# Patient Record
Sex: MALE | Race: ASIAN | NOT HISPANIC OR LATINO
[De-identification: names, ages, dates, MRNs, and addresses within clinical notes are randomized per-mention and may not be internally consistent; named-entity substitution may affect disease eponyms.]

---

## 2019-12-18 ENCOUNTER — APPOINTMENT (OUTPATIENT)
Dept: CARDIOLOGY | Facility: CLINIC | Age: 69
End: 2019-12-18
Payer: MEDICARE

## 2019-12-18 VITALS
WEIGHT: 161 LBS | DIASTOLIC BLOOD PRESSURE: 82 MMHG | BODY MASS INDEX: 24.4 KG/M2 | RESPIRATION RATE: 15 BRPM | HEART RATE: 72 BPM | SYSTOLIC BLOOD PRESSURE: 124 MMHG | HEIGHT: 68 IN

## 2019-12-18 DIAGNOSIS — I10 ESSENTIAL (PRIMARY) HYPERTENSION: ICD-10-CM

## 2019-12-18 PROCEDURE — 93000 ELECTROCARDIOGRAM COMPLETE: CPT

## 2019-12-18 PROCEDURE — 99203 OFFICE O/P NEW LOW 30 MIN: CPT

## 2019-12-18 RX ORDER — LOSARTAN POTASSIUM AND HYDROCHLOROTHIAZIDE 12.5; 5 MG/1; MG/1
50-12.5 TABLET ORAL
Refills: 0 | Status: ACTIVE | COMMUNITY
Start: 2019-12-18

## 2019-12-18 NOTE — PHYSICAL EXAM
[General Appearance - Well Developed] : well developed [Normal Appearance] : normal appearance [General Appearance - Well Nourished] : well nourished [Well Groomed] : well groomed [No Deformities] : no deformities [Normal Conjunctiva] : the conjunctiva exhibited no abnormalities [General Appearance - In No Acute Distress] : no acute distress [Normal Oral Mucosa] : normal oral mucosa [Eyelids - No Xanthelasma] : the eyelids demonstrated no xanthelasmas [No Oral Pallor] : no oral pallor [No Oral Cyanosis] : no oral cyanosis [Heart Sounds] : normal S1 and S2 [Heart Rate And Rhythm] : heart rate and rhythm were normal [Murmurs] : no murmurs present [Respiration, Rhythm And Depth] : normal respiratory rhythm and effort [Auscultation Breath Sounds / Voice Sounds] : lungs were clear to auscultation bilaterally [Exaggerated Use Of Accessory Muscles For Inspiration] : no accessory muscle use [Abdomen Soft] : soft [Abdomen Tenderness] : non-tender [Abdomen Mass (___ Cm)] : no abdominal mass palpated [Nail Clubbing] : no clubbing of the fingernails [Cyanosis, Localized] : no localized cyanosis [Petechial Hemorrhages (___cm)] : no petechial hemorrhages [Skin Color & Pigmentation] : normal skin color and pigmentation [] : no rash [No Venous Stasis] : no venous stasis [Skin Lesions] : no skin lesions [No Skin Ulcers] : no skin ulcer [No Xanthoma] : no  xanthoma was observed [Oriented To Time, Place, And Person] : oriented to person, place, and time [Mood] : the mood was normal [Affect] : the affect was normal [No Anxiety] : not feeling anxious

## 2019-12-18 NOTE — HISTORY OF PRESENT ILLNESS
[FreeTextEntry1] : 1. HTN: on medications, controlled.\par 2. Pre-op: patient is for removal and replacement of penile implant.\par He denies any CP or SOB. ET is stable. He denies any palpitations or dizziness.\par

## 2019-12-18 NOTE — REASON FOR VISIT
[Initial Evaluation] : an initial evaluation of [Hypertension] : hypertension [FreeTextEntry1] : 69 M HTN for pre-op assessment.

## 2019-12-25 ENCOUNTER — INPATIENT (INPATIENT)
Facility: HOSPITAL | Age: 69
LOS: 4 days | Discharge: HOME | DRG: 673 | End: 2019-12-30
Attending: HOSPITALIST | Admitting: HOSPITALIST
Payer: MEDICARE

## 2019-12-25 VITALS
HEIGHT: 68 IN | SYSTOLIC BLOOD PRESSURE: 126 MMHG | RESPIRATION RATE: 16 BRPM | TEMPERATURE: 103 F | HEART RATE: 96 BPM | WEIGHT: 164.91 LBS | OXYGEN SATURATION: 95 % | DIASTOLIC BLOOD PRESSURE: 87 MMHG

## 2019-12-25 DIAGNOSIS — E87.1 HYPO-OSMOLALITY AND HYPONATREMIA: ICD-10-CM

## 2019-12-25 DIAGNOSIS — Z02.9 ENCOUNTER FOR ADMINISTRATIVE EXAMINATIONS, UNSPECIFIED: ICD-10-CM

## 2019-12-25 DIAGNOSIS — J18.9 PNEUMONIA, UNSPECIFIED ORGANISM: ICD-10-CM

## 2019-12-25 DIAGNOSIS — Z90.49 ACQUIRED ABSENCE OF OTHER SPECIFIED PARTS OF DIGESTIVE TRACT: Chronic | ICD-10-CM

## 2019-12-25 DIAGNOSIS — I10 ESSENTIAL (PRIMARY) HYPERTENSION: ICD-10-CM

## 2019-12-25 DIAGNOSIS — A41.9 SEPSIS, UNSPECIFIED ORGANISM: ICD-10-CM

## 2019-12-25 DIAGNOSIS — Z90.79 ACQUIRED ABSENCE OF OTHER GENITAL ORGAN(S): Chronic | ICD-10-CM

## 2019-12-25 DIAGNOSIS — R06.6 HICCOUGH: ICD-10-CM

## 2019-12-25 LAB
ALBUMIN SERPL ELPH-MCNC: 3.8 G/DL — SIGNIFICANT CHANGE UP (ref 3.3–5)
ALP SERPL-CCNC: 53 U/L — SIGNIFICANT CHANGE UP (ref 40–120)
ALT FLD-CCNC: 18 U/L — SIGNIFICANT CHANGE UP (ref 10–45)
ANION GAP SERPL CALC-SCNC: 15 MMOL/L — SIGNIFICANT CHANGE UP (ref 5–17)
APPEARANCE UR: CLEAR — SIGNIFICANT CHANGE UP
APTT BLD: 32.6 SEC — SIGNIFICANT CHANGE UP (ref 27.5–36.3)
AST SERPL-CCNC: 24 U/L — SIGNIFICANT CHANGE UP (ref 10–40)
B PERT DNA SPEC QL NAA+PROBE: SIGNIFICANT CHANGE UP
BACTERIA # UR AUTO: NEGATIVE — SIGNIFICANT CHANGE UP
BASE EXCESS BLDV CALC-SCNC: 3.7 MMOL/L — HIGH (ref -2–2)
BASOPHILS # BLD AUTO: 0.01 K/UL — SIGNIFICANT CHANGE UP (ref 0–0.2)
BASOPHILS NFR BLD AUTO: 0.1 % — SIGNIFICANT CHANGE UP (ref 0–2)
BILIRUB SERPL-MCNC: 1.2 MG/DL — SIGNIFICANT CHANGE UP (ref 0.2–1.2)
BILIRUB UR-MCNC: NEGATIVE — SIGNIFICANT CHANGE UP
BUN SERPL-MCNC: 16 MG/DL — SIGNIFICANT CHANGE UP (ref 7–23)
C PNEUM DNA SPEC QL NAA+PROBE: SIGNIFICANT CHANGE UP
CA-I SERPL-SCNC: 1.04 MMOL/L — LOW (ref 1.12–1.3)
CALCIUM SERPL-MCNC: 8.8 MG/DL — SIGNIFICANT CHANGE UP (ref 8.4–10.5)
CHLORIDE BLDV-SCNC: 97 MMOL/L — SIGNIFICANT CHANGE UP (ref 96–108)
CHLORIDE SERPL-SCNC: 92 MMOL/L — LOW (ref 96–108)
CO2 BLDV-SCNC: 27 MMOL/L — SIGNIFICANT CHANGE UP (ref 22–30)
CO2 SERPL-SCNC: 24 MMOL/L — SIGNIFICANT CHANGE UP (ref 22–31)
COLOR SPEC: YELLOW — SIGNIFICANT CHANGE UP
CREAT SERPL-MCNC: 1.05 MG/DL — SIGNIFICANT CHANGE UP (ref 0.5–1.3)
DIFF PNL FLD: SIGNIFICANT CHANGE UP
EOSINOPHIL # BLD AUTO: 0.01 K/UL — SIGNIFICANT CHANGE UP (ref 0–0.5)
EOSINOPHIL NFR BLD AUTO: 0.1 % — SIGNIFICANT CHANGE UP (ref 0–6)
EPI CELLS # UR: 1 /HPF — SIGNIFICANT CHANGE UP (ref 0–5)
FIBRINOGEN PPP-MCNC: 1048 MG/DL — HIGH (ref 350–510)
FLUAV H1 2009 PAND RNA SPEC QL NAA+PROBE: SIGNIFICANT CHANGE UP
FLUAV H1 RNA SPEC QL NAA+PROBE: SIGNIFICANT CHANGE UP
FLUAV H3 RNA SPEC QL NAA+PROBE: SIGNIFICANT CHANGE UP
FLUAV SUBTYP SPEC NAA+PROBE: SIGNIFICANT CHANGE UP
FLUBV RNA SPEC QL NAA+PROBE: SIGNIFICANT CHANGE UP
GAS PNL BLDV: 128 MMOL/L — LOW (ref 135–145)
GAS PNL BLDV: SIGNIFICANT CHANGE UP
GAS PNL BLDV: SIGNIFICANT CHANGE UP
GLUCOSE BLDV-MCNC: 126 MG/DL — HIGH (ref 70–99)
GLUCOSE SERPL-MCNC: 125 MG/DL — HIGH (ref 70–99)
GLUCOSE UR QL: NEGATIVE — SIGNIFICANT CHANGE UP
HADV DNA SPEC QL NAA+PROBE: SIGNIFICANT CHANGE UP
HAPTOGLOB SERPL-MCNC: 274 MG/DL — HIGH (ref 34–200)
HCO3 BLDV-SCNC: 26 MMOL/L — SIGNIFICANT CHANGE UP (ref 21–29)
HCOV PNL SPEC NAA+PROBE: SIGNIFICANT CHANGE UP
HCT VFR BLD CALC: 43.3 % — SIGNIFICANT CHANGE UP (ref 39–50)
HCT VFR BLDA CALC: 48 % — SIGNIFICANT CHANGE UP (ref 39–50)
HGB BLD CALC-MCNC: 15.5 G/DL — SIGNIFICANT CHANGE UP (ref 13–17)
HGB BLD-MCNC: 14.7 G/DL — SIGNIFICANT CHANGE UP (ref 13–17)
HMPV RNA SPEC QL NAA+PROBE: SIGNIFICANT CHANGE UP
HOROWITZ INDEX BLDV+IHG-RTO: SIGNIFICANT CHANGE UP
HPIV1 RNA SPEC QL NAA+PROBE: SIGNIFICANT CHANGE UP
HPIV2 RNA SPEC QL NAA+PROBE: SIGNIFICANT CHANGE UP
HPIV3 RNA SPEC QL NAA+PROBE: SIGNIFICANT CHANGE UP
HPIV4 RNA SPEC QL NAA+PROBE: SIGNIFICANT CHANGE UP
HYALINE CASTS # UR AUTO: 1 /LPF — SIGNIFICANT CHANGE UP (ref 0–7)
IMM GRANULOCYTES NFR BLD AUTO: 0.5 % — SIGNIFICANT CHANGE UP (ref 0–1.5)
INR BLD: 1.31 RATIO — HIGH (ref 0.88–1.16)
INR BLD: 1.33 RATIO — HIGH (ref 0.88–1.16)
KETONES UR-MCNC: SIGNIFICANT CHANGE UP
LACTATE BLDV-MCNC: 1.9 MMOL/L — SIGNIFICANT CHANGE UP (ref 0.7–2)
LEUKOCYTE ESTERASE UR-ACNC: NEGATIVE — SIGNIFICANT CHANGE UP
LYMPHOCYTES # BLD AUTO: 1.03 K/UL — SIGNIFICANT CHANGE UP (ref 1–3.3)
LYMPHOCYTES # BLD AUTO: 9.1 % — LOW (ref 13–44)
MCHC RBC-ENTMCNC: 31.8 PG — SIGNIFICANT CHANGE UP (ref 27–34)
MCHC RBC-ENTMCNC: 33.9 GM/DL — SIGNIFICANT CHANGE UP (ref 32–36)
MCV RBC AUTO: 93.7 FL — SIGNIFICANT CHANGE UP (ref 80–100)
MONOCYTES # BLD AUTO: 0.83 K/UL — SIGNIFICANT CHANGE UP (ref 0–0.9)
MONOCYTES NFR BLD AUTO: 7.3 % — SIGNIFICANT CHANGE UP (ref 2–14)
NEUTROPHILS # BLD AUTO: 9.43 K/UL — HIGH (ref 1.8–7.4)
NEUTROPHILS NFR BLD AUTO: 82.9 % — HIGH (ref 43–77)
NITRITE UR-MCNC: NEGATIVE — SIGNIFICANT CHANGE UP
NRBC # BLD: 0 /100 WBCS — SIGNIFICANT CHANGE UP (ref 0–0)
PCO2 BLDV: 35 MMHG — SIGNIFICANT CHANGE UP (ref 35–50)
PH BLDV: 7.49 — HIGH (ref 7.35–7.45)
PH UR: 6.5 — SIGNIFICANT CHANGE UP (ref 5–8)
PLATELET # BLD AUTO: 185 K/UL — SIGNIFICANT CHANGE UP (ref 150–400)
PO2 BLDV: 37 MMHG — SIGNIFICANT CHANGE UP (ref 25–45)
POTASSIUM BLDV-SCNC: 3.7 MMOL/L — SIGNIFICANT CHANGE UP (ref 3.5–5.3)
POTASSIUM SERPL-MCNC: 3.9 MMOL/L — SIGNIFICANT CHANGE UP (ref 3.5–5.3)
POTASSIUM SERPL-SCNC: 3.9 MMOL/L — SIGNIFICANT CHANGE UP (ref 3.5–5.3)
PROT SERPL-MCNC: 7.9 G/DL — SIGNIFICANT CHANGE UP (ref 6–8.3)
PROT UR-MCNC: SIGNIFICANT CHANGE UP
PROTHROM AB SERPL-ACNC: 15 SEC — HIGH (ref 10–12.9)
PROTHROM AB SERPL-ACNC: 15.4 SEC — HIGH (ref 10–12.9)
RAPID RVP RESULT: SIGNIFICANT CHANGE UP
RBC # BLD: 4.62 M/UL — SIGNIFICANT CHANGE UP (ref 4.2–5.8)
RBC # FLD: 11.7 % — SIGNIFICANT CHANGE UP (ref 10.3–14.5)
RBC CASTS # UR COMP ASSIST: 1 /HPF — SIGNIFICANT CHANGE UP (ref 0–4)
RSV RNA SPEC QL NAA+PROBE: SIGNIFICANT CHANGE UP
RV+EV RNA SPEC QL NAA+PROBE: SIGNIFICANT CHANGE UP
SAO2 % BLDV: 72 % — SIGNIFICANT CHANGE UP (ref 67–88)
SODIUM SERPL-SCNC: 131 MMOL/L — LOW (ref 135–145)
SP GR SPEC: 1.02 — SIGNIFICANT CHANGE UP (ref 1.01–1.02)
TROPONIN T, HIGH SENSITIVITY RESULT: 8 NG/L — SIGNIFICANT CHANGE UP (ref 0–51)
UROBILINOGEN FLD QL: SIGNIFICANT CHANGE UP
WBC # BLD: 11.37 K/UL — HIGH (ref 3.8–10.5)
WBC # FLD AUTO: 11.37 K/UL — HIGH (ref 3.8–10.5)
WBC UR QL: 2 /HPF — SIGNIFICANT CHANGE UP (ref 0–5)

## 2019-12-25 PROCEDURE — 99285 EMERGENCY DEPT VISIT HI MDM: CPT | Mod: GC

## 2019-12-25 PROCEDURE — 99223 1ST HOSP IP/OBS HIGH 75: CPT

## 2019-12-25 PROCEDURE — 71045 X-RAY EXAM CHEST 1 VIEW: CPT | Mod: 26

## 2019-12-25 PROCEDURE — 74177 CT ABD & PELVIS W/CONTRAST: CPT | Mod: 26

## 2019-12-25 RX ORDER — ACETAMINOPHEN 500 MG
975 TABLET ORAL ONCE
Refills: 0 | Status: COMPLETED | OUTPATIENT
Start: 2019-12-25 | End: 2019-12-25

## 2019-12-25 RX ORDER — SODIUM CHLORIDE 9 MG/ML
1500 INJECTION INTRAMUSCULAR; INTRAVENOUS; SUBCUTANEOUS ONCE
Refills: 0 | Status: COMPLETED | OUTPATIENT
Start: 2019-12-25 | End: 2019-12-25

## 2019-12-25 RX ORDER — METOCLOPRAMIDE HCL 10 MG
10 TABLET ORAL ONCE
Refills: 0 | Status: COMPLETED | OUTPATIENT
Start: 2019-12-25 | End: 2019-12-25

## 2019-12-25 RX ORDER — ACETAMINOPHEN 500 MG
650 TABLET ORAL EVERY 6 HOURS
Refills: 0 | Status: DISCONTINUED | OUTPATIENT
Start: 2019-12-25 | End: 2019-12-27

## 2019-12-25 RX ORDER — AZITHROMYCIN 500 MG/1
500 TABLET, FILM COATED ORAL ONCE
Refills: 0 | Status: COMPLETED | OUTPATIENT
Start: 2019-12-25 | End: 2019-12-25

## 2019-12-25 RX ORDER — VANCOMYCIN HCL 1 G
VIAL (EA) INTRAVENOUS
Refills: 0 | Status: DISCONTINUED | OUTPATIENT
Start: 2019-12-25 | End: 2019-12-26

## 2019-12-25 RX ORDER — ONDANSETRON 8 MG/1
4 TABLET, FILM COATED ORAL EVERY 6 HOURS
Refills: 0 | Status: DISCONTINUED | OUTPATIENT
Start: 2019-12-25 | End: 2019-12-27

## 2019-12-25 RX ORDER — CEFTRIAXONE 500 MG/1
1000 INJECTION, POWDER, FOR SOLUTION INTRAMUSCULAR; INTRAVENOUS ONCE
Refills: 0 | Status: COMPLETED | OUTPATIENT
Start: 2019-12-25 | End: 2019-12-25

## 2019-12-25 RX ORDER — VANCOMYCIN HCL 1 G
1000 VIAL (EA) INTRAVENOUS ONCE
Refills: 0 | Status: COMPLETED | OUTPATIENT
Start: 2019-12-25 | End: 2019-12-25

## 2019-12-25 RX ORDER — PIPERACILLIN AND TAZOBACTAM 4; .5 G/20ML; G/20ML
3.38 INJECTION, POWDER, LYOPHILIZED, FOR SOLUTION INTRAVENOUS EVERY 8 HOURS
Refills: 0 | Status: DISCONTINUED | OUTPATIENT
Start: 2019-12-25 | End: 2019-12-27

## 2019-12-25 RX ORDER — VANCOMYCIN HCL 1 G
1000 VIAL (EA) INTRAVENOUS EVERY 24 HOURS
Refills: 0 | Status: DISCONTINUED | OUTPATIENT
Start: 2019-12-26 | End: 2019-12-26

## 2019-12-25 RX ORDER — ENOXAPARIN SODIUM 100 MG/ML
40 INJECTION SUBCUTANEOUS DAILY
Refills: 0 | Status: DISCONTINUED | OUTPATIENT
Start: 2019-12-25 | End: 2019-12-25

## 2019-12-25 RX ORDER — PIPERACILLIN AND TAZOBACTAM 4; .5 G/20ML; G/20ML
3.38 INJECTION, POWDER, LYOPHILIZED, FOR SOLUTION INTRAVENOUS ONCE
Refills: 0 | Status: COMPLETED | OUTPATIENT
Start: 2019-12-25 | End: 2019-12-25

## 2019-12-25 RX ADMIN — Medication 975 MILLIGRAM(S): at 06:34

## 2019-12-25 RX ADMIN — SODIUM CHLORIDE 1500 MILLILITER(S): 9 INJECTION INTRAMUSCULAR; INTRAVENOUS; SUBCUTANEOUS at 06:33

## 2019-12-25 RX ADMIN — Medication 10 MILLIGRAM(S): at 06:55

## 2019-12-25 RX ADMIN — Medication 650 MILLIGRAM(S): at 20:50

## 2019-12-25 RX ADMIN — AZITHROMYCIN 250 MILLIGRAM(S): 500 TABLET, FILM COATED ORAL at 06:50

## 2019-12-25 RX ADMIN — CEFTRIAXONE 100 MILLIGRAM(S): 500 INJECTION, POWDER, FOR SOLUTION INTRAMUSCULAR; INTRAVENOUS at 06:32

## 2019-12-25 RX ADMIN — Medication 650 MILLIGRAM(S): at 19:53

## 2019-12-25 RX ADMIN — Medication 250 MILLIGRAM(S): at 19:54

## 2019-12-25 RX ADMIN — PIPERACILLIN AND TAZOBACTAM 200 GRAM(S): 4; .5 INJECTION, POWDER, LYOPHILIZED, FOR SOLUTION INTRAVENOUS at 22:27

## 2019-12-25 NOTE — ED ADULT NURSE NOTE - OBJECTIVE STATEMENT
68 Yo male BIBEMS for undiagnosed flu. Patient reports he has not been feeling well for 5 days, with associated burning upon urination on day 1. Patient reports that today, patient reporting worsening fever and chills. Family noticed patient was very warm, called EMS, found to be 102 oral temp. Patient A&OX3, denies chest pain, SOB, HA, N/V/D, abdominal pain, fever/chills, urinary symptoms, hematuria, weakness, dizziness, numbness, tinging. Peripheral pulses present b/l. Skin warm, dry and pink. Pt placed in position of comfort. Pt educated on call bell system and provided call bell. Bed in lowest position, wheels locked, appropriate side rails raised. Pt denies needs at this time.

## 2019-12-25 NOTE — H&P ADULT - PROBLEM SELECTOR PLAN 2
Unclear of source of any infection   S/P Zithromax and Ceftriaxone in the ED   CXR / RVP are non revealing   F/UP Blood CX   F/up Lactate   F/up repeat Lactate   INR is mildly elevated / will repeat and add fibrinogen , PLT Is reasonable with no bleed   Will get U/a as pt states that in 6/2019 , [place] and in 8/2019 , he went to [place] to have the implant removed due to report of infection   F/UP UCX   Will get CT of ABD and might get Urology involved  cont Ceftriaxone Unclear of source of any infection   S/P Zithromax and Ceftriaxone in the ED   CXR / RVP are non revealing   F/UP Blood CX   F/up repeat Lactate   INR is mildly elevated / will repeat and add fibrinogen , PLT Is reasonable with no bleed  unsure if secondary to sepsis / will repeat lab and get haptoglobin   Will get U/a as pt states that in 6/2019 , [place] and in 8/2019 , he went to [place] to have the implant removed due to report of infection   F/UP UCX   Will get CT of ABD and might get Urology involved  cont Ceftriaxone for now

## 2019-12-25 NOTE — ED PROVIDER NOTE - CLINICAL SUMMARY MEDICAL DECISION MAKING FREE TEXT BOX
69M presenting for evaluation of fevers with hiccups, on exam with coarse breath sounds left lower lobe o/w clear, hx of 5 days of dysuria, will check sepsis labs, treat with fluids, empirically antibiose, follow up studies, reassess, dispo.

## 2019-12-25 NOTE — H&P ADULT - PROBLEM SELECTOR PLAN 4
Improved now   will cont to monitor   since pt has high fever and recent urologic procedure , will get CT of abd

## 2019-12-25 NOTE — ED PROVIDER NOTE - OBJECTIVE STATEMENT
1 week ago mild dysuria, for the past 3-4 days cough and congestion with fevers, tonight had hiccups for hours so decided to come in as they are causing him pain in his upper chest, still eating and drinking OK, no nausea or vomiting, has had intermittent diaphoresis. Notes that his chest pain is only present with hiccuping at first but now feels like a burning sensation in his esophagus, no other complaints at this time, no hx of cardiac disease, has htn, non smoker, non drinker, no ill contacts, did get flu vaccine this year. 1 week ago mild dysuria, for the past 3-4 days cough and congestion with fevers, tonight had hiccups for hours so decided to come in as they are causing him pain in his upper chest, still eating and drinking OK, no nausea or vomiting, has had intermittent diaphoresis. Notes that his chest pain is only present with hiccuping at first but now feels like a burning sensation in his esophagus, no other complaints at this time, no hx of cardiac disease, has htn, non smoker, non drinker, no ill contacts, did get flu vaccine this year.    PMD: Alec Durán in [place]. (unattached)

## 2019-12-25 NOTE — CONSULT NOTE ADULT - ATTENDING COMMENTS
Pt seen and examined  films reviewed- CT- appears to have extender cap persistent, ? partially in and partially out proximal left corpora dorsally at base.  Mildly tender, but no fluctuance in infrapubic region, along penile shaft.  No erythema.  agree with plan above  Will plan for penile and scrotal ultrasound to more fully image  fever source unclear  d/w pt, family

## 2019-12-25 NOTE — CONSULT NOTE ADULT - SUBJECTIVE AND OBJECTIVE BOX
HPI  69 year old male with hx of HTN, penile prosthesis  s/p explant in [place]  presenting to the hospital with fevers x1 week. He did note self-resolving URI symptoms. Complains that genital area feels "hot". No difficulty urinating or urinary symptoms.        PAST MEDICAL & SURGICAL HISTORY:  Prostate cancer: denies chemo and radiation  Hypertension  Rotator cuff disorder  S/P prostatectomy:   S/P appendectomy: 20y/o      MEDICATIONS  (STANDING):    MEDICATIONS  (PRN):  acetaminophen   Tablet .. 650 milliGRAM(s) Oral every 6 hours PRN Mild Pain (1 - 3)  ondansetron Injectable 4 milliGRAM(s) IV Push every 6 hours PRN Nausea      Allergies  No Known Allergies      REVIEW OF SYSTEMS:   Otherwise negative as stated in HPI    Physical Exam  Vital signs  T(C): 37.4 (19 @ 14:42), Max: 39.4 (19 @ 05:42)  HR: 85 (19 @ 14:42)  BP: 120/79 (19 @ 14:42)  SpO2: 94% (19 @ 14:42)  Wt(kg): --    Output    OUT:    Voided: 160 mL  Total OUT: 160 mL    Total NET: -160 mL      Gen:  NAD    Pulm:  No respiratory distress  	  CV:  RRR    GI:  S/ND/NT    :  Mild scrotal erythema  Firmness to penis and base  No crepitus or tenderness    MSK:      LABS:       @ 06:30    WBC 11.37 / Hct 43.3  / SCr 1.05         131<L>  |  92<L>  |  16  ----------------------------<  125<H>  3.9   |  24  |  1.05    Ca    8.8      25 Dec 2019 06:30    TPro  7.9  /  Alb  3.8  /  TBili  1.2  /  DBili  x   /  AST  24  /  ALT  18  /  AlkPhos  53      PT/INR - ( 25 Dec 2019 12:41 )   PT: 15.0 sec;   INR: 1.31 ratio         PTT - ( 25 Dec 2019 06:30 )  PTT:32.6 sec  Urinalysis Basic - ( 25 Dec 2019 15:29 )    Color: Yellow / Appearance: Clear / S.016 / pH: x  Gluc: x / Ketone: Trace  / Bili: Negative / Urobili: <2 mg/dL   Blood: x / Protein: Trace / Nitrite: Negative   Leuk Esterase: Negative / RBC: x / WBC x   Sq Epi: x / Non Sq Epi: x / Bacteria: x        Urine Cx:  Blood Cx:    RADIOLOGY: HPI  69 year old male with hx of HTN, penile prosthesis  s/p explant in [place]  presenting to the hospital with fevers x1 week. He did note self-resolving URI symptoms. Complains that genital area feels "hot". No difficulty urinating or urinary symptoms.  Also with issues of congestion, cough, fever.      PAST MEDICAL & SURGICAL HISTORY:  Prostate cancer: denies chemo and radiation  Hypertension  Rotator cuff disorder  S/P prostatectomy:   S/P appendectomy: 20y/o      MEDICATIONS  (STANDING):    MEDICATIONS  (PRN):  acetaminophen   Tablet .. 650 milliGRAM(s) Oral every 6 hours PRN Mild Pain (1 - 3)  ondansetron Injectable 4 milliGRAM(s) IV Push every 6 hours PRN Nausea      Allergies  No Known Allergies      REVIEW OF SYSTEMS:   Otherwise negative as stated in HPI    Physical Exam  Vital signs  T(C): 37.4 (19 @ 14:42), Max: 39.4 (19 @ 05:42)  HR: 85 (19 @ 14:42)  BP: 120/79 (19 @ 14:42)  SpO2: 94% (19 @ 14:42)  Wt(kg): --    Output    OUT:    Voided: 160 mL  Total OUT: 160 mL    Total NET: -160 mL      Gen:  NAD    Pulm:  No respiratory distress  	  CV:  RRR    GI:  S/ND/NT    :  Mild scrotal erythema  Firmness to penis and base  No crepitus or tenderness    MSK:      LABS:       @ 06:30    WBC 11.37 / Hct 43.3  / SCr 1.05         131<L>  |  92<L>  |  16  ----------------------------<  125<H>  3.9   |  24  |  1.05    Ca    8.8      25 Dec 2019 06:30    TPro  7.9  /  Alb  3.8  /  TBili  1.2  /  DBili  x   /  AST  24  /  ALT  18  /  AlkPhos  53      PT/INR - ( 25 Dec 2019 12:41 )   PT: 15.0 sec;   INR: 1.31 ratio         PTT - ( 25 Dec 2019 06:30 )  PTT:32.6 sec  Urinalysis Basic - ( 25 Dec 2019 15:29 )    Color: Yellow / Appearance: Clear / S.016 / pH: x  Gluc: x / Ketone: Trace  / Bili: Negative / Urobili: <2 mg/dL   Blood: x / Protein: Trace / Nitrite: Negative   Leuk Esterase: Negative / RBC: x / WBC x   Sq Epi: x / Non Sq Epi: x / Bacteria: x        Urine Cx:  Blood Cx:    RADIOLOGY:

## 2019-12-25 NOTE — ED PROVIDER NOTE - ATTENDING CONTRIBUTION TO CARE
70yo male pmh HTN, prostate CA p/w cough, congestion, hiccups, diaphoresis, chest burning. Denies n/v/d, recent travel, LLL ronchi, abd soft, febrile. Sepsis workup.

## 2019-12-25 NOTE — H&P ADULT - PROBLEM SELECTOR PLAN 1
Patient is on Losartan/HCTz at home / he does not have the dose   Since pt has sepsis of unclear origin and BP is on the soft range/ Will hold antihypertensive and once BP stabilizes , will restart with the lowest dose of Losartan   his RX is [facility] (  ) and no answer today   MOnitor BP Patient is on Losartan/HCTz at home / he does not have the dose   Since pt has sepsis of unclear origin and BP is on the soft range/ Will hold antihypertensive and once BP stabilizes , will restart with the lowest dose of Losartan and uptitrate as needed   his RX is [facility] (  ) and no answer today   MOnitor BP  Low salt diet

## 2019-12-25 NOTE — H&P ADULT - ASSESSMENT
70 YO male with H/O of HTN, Reported Prostate CA , recent Penile implant surgery in 6/2019 with removal of the implant in [place] in 8 /2019 as per patient came to the hospital with subjective fever or about one week.

## 2019-12-25 NOTE — ED PROVIDER NOTE - PHYSICAL EXAMINATION
Gen: uncomfortable appearing but conversational and polite  Eyes: PERRLA, EOMI   HENT: Normocephalic, atraumatic. External ears normal, no rhinorrhea, moist mucous membranes.   CV: RRR, no M/R/G  Resp: coarse breath sounds left lower fields, o/w clear, non-labored, speaking without difficulty on room air  Abd: soft, non tender, non rigid, no guarding or rebound tenderness  Back: No CVAT bilaterally, no midline ttp  Skin: moist, cap refill <2 s distally  Neuro: AOx3, speech is fluent and appropriate  Psych: Mood concerned, affect euthymic

## 2019-12-25 NOTE — H&P ADULT - NSHPOUTPATIENTPROVIDERS_GEN_ALL_CORE
PMD: Alec Durán in [place]. PMD: Alec Durán in [place].  Urologist  : Dr Hrary Marshall ( [facility] in [place]) [phone]

## 2019-12-25 NOTE — H&P ADULT - PROBLEM SELECTOR PLAN 3
MOst likely due to volume depletion   Repeat Na and if low will get urine studies   Mentation is intact

## 2019-12-25 NOTE — PATIENT PROFILE ADULT - BRADEN ACTIVITY
Pt evaluated and treated by IRMA Odom. See NP note for full physical exam.   
(3) walks occasionally

## 2019-12-25 NOTE — H&P ADULT - ATTENDING COMMENTS
Pt has troponin and cardiac enzyme pend / Pt had no chest pain , when seen by me .  Please f/up results       Della Sterlingre   Hospitalist

## 2019-12-25 NOTE — H&P ADULT - HISTORY OF PRESENT ILLNESS
For the past 3-4 days cough and congestion with fevers    ED   :  Tm = 102.9F  HR = 72-96   RR = 16-22   SPO2 = 93-97% RA  Pt received 1500ml of NS, Tylenol 975 mg oral once , ZIthromax 500mg IV , Ceftriaxone 1 gram IV once and Reglan 10 mg IV once . 70 YO male with H/O of HTN, Reported Prostate CA , recent Penile implant surgery in 6/2019 with removal of the implant in [place] in 8 /2019 as per patient came to the hospital with subjective fever or about one week.  Symptoms were preceded by dry cough, congestion, with spontaneous  resolution .  Patient states that he could not urinate a few days ago because he could not eat.  Patient denies any abd pain or diarrhea or vomiting.  NO hematuria .  NO polyuria.  NO current chest pain     ED   :  Tm = 102.9F  HR = 72-96   RR = 16-22   SPO2 = 93-97% RA  Pt received 1500ml of NS, Tylenol 975 mg oral once , ZIthromax 500mg IV , Ceftriaxone 1 gram IV once and Reglan 10 mg IV once . 68 YO male with H/O of HTN, Reported Prostate CA unsure of treatment , recent Penile implant surgery in 6/2019 with removal of the implant in [place] in 8 /2019 as per patient came to the hospital with subjective fever for about one week.  Symptoms were preceded by dry cough, congestion, with spontaneous  resolution .  Patient states that the URI symptoms have abetted but is now having hiccup for one week that cause irritation and hence report to the ED.  NO SOB, no actual chest pain , no diarrhea, no vomiting.   Patient states that he could not urinate a few days ago because he could not eat.  Patient denies any abd pain.  NO hematuria .  NO polyuria.  NO current chest pain .  Patient states that in 6/2019, he had a penile implant which he states became infected and since he could not see his urologist fast enough , he went to [place] and had the implant removed as he states.  He states that he was doing ok since 8/2019 after the surgery.  NO known sick contact.      ED   :  Tm = 102.9F  HR = 72-96   RR = 16-22   SPO2 = 93-97% RA  Pt received 1500ml of NS, Tylenol 975 mg oral once , ZIthromax 500mg IV , Ceftriaxone 1 gram IV once and Reglan 10 mg IV once .

## 2019-12-25 NOTE — H&P ADULT - NSICDXPASTMEDICALHX_GEN_ALL_CORE_FT
PAST MEDICAL HISTORY:  Hypertension     Prostate cancer denies chemo and radiation    Rotator cuff disorder

## 2019-12-25 NOTE — CONSULT NOTE ADULT - ASSESSMENT
69 year old male with hx of HTN, penile prosthesis 6/19 s/p explant in [place] 8/19 presenting with fevers. CT shows stranding around remaining piece of prosthesis with small collection (partially imaged)    - Recommend penile ultrasound and scrotal ultrasound  - Trend fevers  - Broaden antibiotics to vancomycin and zosyn  - F/u cultures 69 year old male with hx of HTN, penile prosthesis 6/19 s/p explant in [place] 8/19 presenting with fevers. CT shows stranding around remaining piece of prosthesis with small collection (partially imaged)    - Recommend penile ultrasound and scrotal ultrasound to evaluate for abscess and better delineate anatomy  - Trend fevers  - Broaden antibiotics to vancomycin and zosyn  - F/u cultures

## 2019-12-25 NOTE — ED ADULT NURSE REASSESSMENT NOTE - NS ED NURSE REASSESS COMMENT FT1
Report received from NEYMAR Lee.  Patient awake and alert to person, place and time with family at bedside.  Patient reports some dizziness at this time.  Patient informed to call for assistance and not to get out of bed without calling for help.  Patient aware urine sample needed and urinal at bedside.  L/s clear to auscultation.  Patient denies: chest pain, dizziness. Report received from NEYMAR Lee.  Patient awake and alert to person, place and time with family at bedside.  Patient reports some dizziness at this time.  Patient informed to call for assistance and not to get out of bed without calling for help.  Patient aware urine sample needed and urinal at bedside.  L/s crackles at bases b/l otherwise clear to auscultation.  Patient denies: chest pain, dizziness.

## 2019-12-25 NOTE — H&P ADULT - NSHPLABSRESULTS_GEN_ALL_CORE
Lab results are reviewed by me :  WBC = 11.37  H/H = 14.7 /43.3  PLT = 185  N = 82.9  L-= 9.1   INR = 1,33  PT = 15.4   NA = 131  Cr = 1.05  RVP Neg /  CXR : Neg   Lact = 1.9  PH= 7.49

## 2019-12-26 ENCOUNTER — TRANSCRIPTION ENCOUNTER (OUTPATIENT)
Age: 69
End: 2019-12-26

## 2019-12-26 DIAGNOSIS — M79.5 RESIDUAL FOREIGN BODY IN SOFT TISSUE: ICD-10-CM

## 2019-12-26 DIAGNOSIS — Z29.9 ENCOUNTER FOR PROPHYLACTIC MEASURES, UNSPECIFIED: ICD-10-CM

## 2019-12-26 DIAGNOSIS — R78.81 BACTEREMIA: ICD-10-CM

## 2019-12-26 DIAGNOSIS — A41.9 SEPSIS, UNSPECIFIED ORGANISM: ICD-10-CM

## 2019-12-26 LAB
-  CANDIDA ALBICANS: SIGNIFICANT CHANGE UP
-  CANDIDA GLABRATA: SIGNIFICANT CHANGE UP
-  CANDIDA KRUSEI: SIGNIFICANT CHANGE UP
-  CANDIDA PARAPSILOSIS: SIGNIFICANT CHANGE UP
-  CANDIDA TROPICALIS: SIGNIFICANT CHANGE UP
-  COAGULASE NEGATIVE STAPHYLOCOCCUS: SIGNIFICANT CHANGE UP
-  K. PNEUMONIAE GROUP: SIGNIFICANT CHANGE UP
-  KPC RESISTANCE GENE: SIGNIFICANT CHANGE UP
-  STREPTOCOCCUS SP. (NOT GRP A, B OR S PNEUMONIAE): SIGNIFICANT CHANGE UP
A BAUMANNII DNA SPEC QL NAA+PROBE: SIGNIFICANT CHANGE UP
ALBUMIN SERPL ELPH-MCNC: 2.9 G/DL — LOW (ref 3.3–5)
ALP SERPL-CCNC: 48 U/L — SIGNIFICANT CHANGE UP (ref 40–120)
ALT FLD-CCNC: 25 U/L — SIGNIFICANT CHANGE UP (ref 10–45)
ANION GAP SERPL CALC-SCNC: 13 MMOL/L — SIGNIFICANT CHANGE UP (ref 5–17)
AST SERPL-CCNC: 31 U/L — SIGNIFICANT CHANGE UP (ref 10–40)
BASOPHILS # BLD AUTO: 0.03 K/UL — SIGNIFICANT CHANGE UP (ref 0–0.2)
BASOPHILS NFR BLD AUTO: 0.4 % — SIGNIFICANT CHANGE UP (ref 0–2)
BILIRUB SERPL-MCNC: 0.5 MG/DL — SIGNIFICANT CHANGE UP (ref 0.2–1.2)
BUN SERPL-MCNC: 11 MG/DL — SIGNIFICANT CHANGE UP (ref 7–23)
CALCIUM SERPL-MCNC: 8.3 MG/DL — LOW (ref 8.4–10.5)
CHLORIDE SERPL-SCNC: 98 MMOL/L — SIGNIFICANT CHANGE UP (ref 96–108)
CO2 SERPL-SCNC: 23 MMOL/L — SIGNIFICANT CHANGE UP (ref 22–31)
CREAT SERPL-MCNC: 0.9 MG/DL — SIGNIFICANT CHANGE UP (ref 0.5–1.3)
E CLOAC COMP DNA BLD POS QL NAA+PROBE: SIGNIFICANT CHANGE UP
E COLI DNA BLD POS QL NAA+NON-PROBE: SIGNIFICANT CHANGE UP
ENTEROCOC DNA BLD POS QL NAA+NON-PROBE: SIGNIFICANT CHANGE UP
ENTEROCOC DNA BLD POS QL NAA+NON-PROBE: SIGNIFICANT CHANGE UP
EOSINOPHIL # BLD AUTO: 0.1 K/UL — SIGNIFICANT CHANGE UP (ref 0–0.5)
EOSINOPHIL NFR BLD AUTO: 1.2 % — SIGNIFICANT CHANGE UP (ref 0–6)
GLUCOSE SERPL-MCNC: 120 MG/DL — HIGH (ref 70–99)
GP B STREP DNA BLD POS QL NAA+NON-PROBE: SIGNIFICANT CHANGE UP
GRAM STN FLD: SIGNIFICANT CHANGE UP
HAEM INFLU DNA BLD POS QL NAA+NON-PROBE: SIGNIFICANT CHANGE UP
HCT VFR BLD CALC: 39.5 % — SIGNIFICANT CHANGE UP (ref 39–50)
HCV AB S/CO SERPL IA: 0.14 S/CO — SIGNIFICANT CHANGE UP (ref 0–0.99)
HCV AB SERPL-IMP: SIGNIFICANT CHANGE UP
HGB BLD-MCNC: 13.3 G/DL — SIGNIFICANT CHANGE UP (ref 13–17)
IMM GRANULOCYTES NFR BLD AUTO: 0.4 % — SIGNIFICANT CHANGE UP (ref 0–1.5)
INR BLD: 1.24 RATIO — HIGH (ref 0.88–1.16)
K OXYTOCA DNA BLD POS QL NAA+NON-PROBE: SIGNIFICANT CHANGE UP
L MONOCYTOG DNA BLD POS QL NAA+NON-PROBE: SIGNIFICANT CHANGE UP
LACTATE SERPL-SCNC: 1.1 MMOL/L — SIGNIFICANT CHANGE UP (ref 0.7–2)
LEGIONELLA AG UR QL: NEGATIVE — SIGNIFICANT CHANGE UP
LIDOCAIN IGE QN: 20 U/L — SIGNIFICANT CHANGE UP (ref 7–60)
LYMPHOCYTES # BLD AUTO: 0.82 K/UL — LOW (ref 1–3.3)
LYMPHOCYTES # BLD AUTO: 9.9 % — LOW (ref 13–44)
MAGNESIUM SERPL-MCNC: 2.3 MG/DL — SIGNIFICANT CHANGE UP (ref 1.6–2.6)
MCHC RBC-ENTMCNC: 32 PG — SIGNIFICANT CHANGE UP (ref 27–34)
MCHC RBC-ENTMCNC: 33.7 GM/DL — SIGNIFICANT CHANGE UP (ref 32–36)
MCV RBC AUTO: 95.2 FL — SIGNIFICANT CHANGE UP (ref 80–100)
METHOD TYPE: SIGNIFICANT CHANGE UP
MONOCYTES # BLD AUTO: 0.92 K/UL — HIGH (ref 0–0.9)
MONOCYTES NFR BLD AUTO: 11.1 % — SIGNIFICANT CHANGE UP (ref 2–14)
MRSA SPEC QL CULT: SIGNIFICANT CHANGE UP
MSSA DNA SPEC QL NAA+PROBE: SIGNIFICANT CHANGE UP
N MEN ISLT CULT: SIGNIFICANT CHANGE UP
NEUTROPHILS # BLD AUTO: 6.41 K/UL — SIGNIFICANT CHANGE UP (ref 1.8–7.4)
NEUTROPHILS NFR BLD AUTO: 77 % — SIGNIFICANT CHANGE UP (ref 43–77)
P AERUGINOSA DNA BLD POS NAA+NON-PROBE: SIGNIFICANT CHANGE UP
PLATELET # BLD AUTO: 178 K/UL — SIGNIFICANT CHANGE UP (ref 150–400)
POTASSIUM SERPL-MCNC: 3.9 MMOL/L — SIGNIFICANT CHANGE UP (ref 3.5–5.3)
POTASSIUM SERPL-SCNC: 3.9 MMOL/L — SIGNIFICANT CHANGE UP (ref 3.5–5.3)
PROT SERPL-MCNC: 6.8 G/DL — SIGNIFICANT CHANGE UP (ref 6–8.3)
PROTHROM AB SERPL-ACNC: 14.2 SEC — HIGH (ref 10–13.1)
RBC # BLD: 4.15 M/UL — LOW (ref 4.2–5.8)
RBC # FLD: 11.9 % — SIGNIFICANT CHANGE UP (ref 10.3–14.5)
S MARCESCENS DNA BLD POS NAA+NON-PROBE: SIGNIFICANT CHANGE UP
S PNEUM DNA BLD POS QL NAA+NON-PROBE: SIGNIFICANT CHANGE UP
S PYO DNA BLD POS QL NAA+NON-PROBE: SIGNIFICANT CHANGE UP
SODIUM SERPL-SCNC: 134 MMOL/L — LOW (ref 135–145)
SPECIMEN SOURCE: SIGNIFICANT CHANGE UP
WBC # BLD: 8.31 K/UL — SIGNIFICANT CHANGE UP (ref 3.8–10.5)
WBC # FLD AUTO: 8.31 K/UL — SIGNIFICANT CHANGE UP (ref 3.8–10.5)

## 2019-12-26 PROCEDURE — 76870 US EXAM SCROTUM: CPT | Mod: 26

## 2019-12-26 PROCEDURE — 93975 VASCULAR STUDY: CPT | Mod: 26

## 2019-12-26 PROCEDURE — 99233 SBSQ HOSP IP/OBS HIGH 50: CPT

## 2019-12-26 RX ORDER — VANCOMYCIN HCL 1 G
750 VIAL (EA) INTRAVENOUS ONCE
Refills: 0 | Status: COMPLETED | OUTPATIENT
Start: 2019-12-26 | End: 2019-12-26

## 2019-12-26 RX ORDER — VANCOMYCIN HCL 1 G
750 VIAL (EA) INTRAVENOUS EVERY 8 HOURS
Refills: 0 | Status: DISCONTINUED | OUTPATIENT
Start: 2019-12-26 | End: 2019-12-27

## 2019-12-26 RX ORDER — VANCOMYCIN HCL 1 G
VIAL (EA) INTRAVENOUS
Refills: 0 | Status: DISCONTINUED | OUTPATIENT
Start: 2019-12-26 | End: 2019-12-27

## 2019-12-26 RX ADMIN — PIPERACILLIN AND TAZOBACTAM 25 GRAM(S): 4; .5 INJECTION, POWDER, LYOPHILIZED, FOR SOLUTION INTRAVENOUS at 13:17

## 2019-12-26 RX ADMIN — PIPERACILLIN AND TAZOBACTAM 25 GRAM(S): 4; .5 INJECTION, POWDER, LYOPHILIZED, FOR SOLUTION INTRAVENOUS at 05:17

## 2019-12-26 RX ADMIN — Medication 250 MILLIGRAM(S): at 21:20

## 2019-12-26 RX ADMIN — Medication 250 MILLIGRAM(S): at 13:17

## 2019-12-26 NOTE — PROGRESS NOTE ADULT - PROBLEM SELECTOR PLAN 2
Gram varible rods in 1 blood culture bottle  Will send for repeat cultures and continue Vancomycin and Zosyn for now and F/U repeat blood cultures  Will need source control with retained foreign body, urology following and plan for OR tomorrow.

## 2019-12-26 NOTE — PROGRESS NOTE ADULT - PROBLEM SELECTOR PLAN 3
Had penile implant placed in 6/2019 and subsequently had it removed due to infection in [place] in 8/2019.  He has been feeling well since but now fevers and on CT has retained portion.  Will Continue Vancomycin and Zosyn for now and plan for OR tomorrow to remove foreign body and send cultures. Will f/u culture results and tailor antiboitics.

## 2019-12-26 NOTE — CHART NOTE - NSCHARTNOTEFT_GEN_A_CORE
Medicine PA Note  Critical Value Notification    Name: RAS WEBSTER  MRN: [id number]  Age: 69y Gender: Male    Notified by RN that the blood cultures from 12/25 were resulted. Preliminary results show gram variable rods in the aerobic bottle. Current antimicrobial therapy includes Zosyn and Vanco. Final results and sensitivities from 12/25 are pending.     - Repeat BCx were ordered  - Follow-up with final BCx results  - Continue current abx regimen  - Consider ID consult in AM    Will endorse to primary team in AM.    MAJO MunizC  Department of Medicine   [facility]

## 2019-12-26 NOTE — PROGRESS NOTE ADULT - SUBJECTIVE AND OBJECTIVE BOX
Subjective    Seen & examined at bedside  Febrile overnight  BCx + for gram variable rods  Feeling ok this morning.  No dysuria    Objective    Vital signs  T(F): , Max: 101.7 (12-25-19 @ 19:46)  HR: 78 (12-26-19 @ 04:57)  BP: 107/74 (12-26-19 @ 04:57)  SpO2: 93% (12-26-19 @ 04:57)  Wt(kg): --    Output     12-25 @ 07:01  -  12-26 @ 07:00  --------------------------------------------------------  IN: 420 mL / OUT: 160 mL / NET: 260 mL    Physical Exam  Gen NAD  Pulm No respiratory distress  GI S/ND/NT   Mild scrotal erythema, Firmness to penis and base, No crepitus or tenderness    Labs  12-26 @ 08:51  WBC 8.31  / Hct 39.5  / SCr --       12-26 @ 07:27  WBC --    / Hct --    / SCr 0.90     Culture - Blood (12.25.19 @ 09:05)    -  Multidrug (KPC pos) resistant organism: Nondet    -  Staphylococcus aureus: Nondet    -  Methicillin resistant Staphylococcus aureus (MRSA): Nondet    -  Coagulase negative Staphylococcus: Nondet    -  Enterococcus species: Nondet    -  Vancomycin resistant Enterococcus sp.: Nondet    -  Escherichia coli: Nondet    -  Klebsiella oxytoca: Nondet    -  Klebsiella pneumoniae: Nondet    -  Serratia marcescens: Nondet    -  Haemophilus influenzae: Nondet    -  Listeria monocytogenes: Nondet    -  Neisseria meningitidis: Nondet    -  Pseudomonas aeruginosa: Nondet    -  Acinetobacter baumanii: Nondet    -  Enterobacter cloacae complex: Nondet    -  Streptococcus sp. (Not Grp A, B or S pneumoniae): Nondet    -  Streptococcus agalactiae (Group B): Nondet    -  Streptococcus pyogenes (Group A): Nondet    -  Streptococcus pneumoniae: Nondet    -  Candida albicans: Nondet    -  Candida glabrata: Nondet    -  Candida krusei: Nondet    -  Candida parapsilosis: Nondet    -  Candida tropicalis: Nondet    Gram Stain:   Growth in aerobic bottle: Gram Variable Rods    Specimen Source: .Blood Blood-Peripheral    Organism: Blood Culture PCR    Culture Results:   Growth in aerobic bottle: Gram Variable Rods  "Due to technical problems, Proteus sp. will Not be reported as part of  the BCID panel until further notice"  ***Blood Panel PCR results on this specimen are available  approximately 3 hours after the Gram stain result.***  Gram stain, PCR, and/or culture results may not always  correspond due to difference in methodologies.  ************************************************************  This PCR assay was performed using [employer].  The following targets are tested for: Enterococcus,  vancomycin resistant enterococci, Listeria monocytogenes,  coagulase negative staphylococci, S. aureus,  methicillin resistant S. aureus, Streptococcus agalactiae  (Group B), S. pneumoniae, S. pyogenes (Group A),  Acinetobacter baumannii, Enterobacter cloacae, E. coli,  Klebsiella oxytoca, K. pneumoniae, Proteus sp.,  Serratia marcescens, Haemophilus influenzae,  Neisseria meningitidis, Pseudomonas aeruginosa, Candida  albicans, C. glabrata, C krusei, C parapsilosis,  C. tropicalis and the KPC resistance gene.    Organism Identification: Blood Culture PCR    Method Type: PCR    Imaging    < from: CT Abdomen and Pelvis w/ IV Cont (12.25.19 @ 15:05) >  IMPRESSION:     Minimal patchy consolidation in the lingula.    No acute intra-abdominal pathology.    Foreign body noted within the base of the penis with surrounding stranding. On image 170 of series 3, there is a small fluid collection noted but this is only partially imaged. Correlate with physical exam for abscess/cellulitis.    < end of copied text >

## 2019-12-26 NOTE — PROGRESS NOTE ADULT - ASSESSMENT
68 YO male with H/O of HTN, Reported Prostate CA , recent Penile implant surgery in 6/2019 with removal of the implant in [place] in 8 /2019 as per patient came to the hospital with subjective fever or about one week.

## 2019-12-26 NOTE — PROGRESS NOTE ADULT - ASSESSMENT
69 year old male with hx of HTN, penile prosthesis 6/19 s/p explant in [place] 8/19 presenting with fevers. CT shows stranding around remaining piece of prosthesis with small collection (partially imaged).    Unclear what exactly is still left behind in the corporal body.  Usually rear tip extenders are the retained fragment after penile prosthesis, but normally located in the deepest portion of the corpora.    - Recommend penile ultrasound and scrotal ultrasound to evaluate for abscess and better delineate anatomy  - Will plan for possible OR tomorrow for penile exploration, possible penile prosthesis explantation  - Please pre-operatively clear patient, will obtain consent today  - Will add onto OR for tomorrow w/ Dr. Hoenig  - Trend fevers  - c/w Vanc/Zosyn  - F/u cultures

## 2019-12-26 NOTE — PHARMACOTHERAPY INTERVENTION NOTE - COMMENTS
70 yo M with sepsis/bacteremia currently on zosyn 3.375g IV Q8H and vancomycin 1g IV Q24H (last dose 2/25 20:00). SCr 0.9 (CKD-EPI 87 mL/min) - expected trough with current regimen ~3. Recommendation made to increase to vancomycin 750mg IV Q8H (expected trough ~12).    Chester Bolton, PharmD, BCPS  [phone]

## 2019-12-26 NOTE — PROVIDER CONTACT NOTE (CRITICAL VALUE NOTIFICATION) - SITUATION
Critical value of blood cultures from 12/25 came back as growth in the aerobic bottle of gram variable rods.

## 2019-12-27 ENCOUNTER — RESULT REVIEW (OUTPATIENT)
Age: 69
End: 2019-12-27

## 2019-12-27 LAB
-  AMIKACIN: SIGNIFICANT CHANGE UP
-  AMPICILLIN/SULBACTAM: SIGNIFICANT CHANGE UP
-  CEFEPIME: SIGNIFICANT CHANGE UP
-  CEFTAZIDIME: SIGNIFICANT CHANGE UP
-  CIPROFLOXACIN: SIGNIFICANT CHANGE UP
-  GENTAMICIN: SIGNIFICANT CHANGE UP
-  IMIPENEM: SIGNIFICANT CHANGE UP
-  LEVOFLOXACIN: SIGNIFICANT CHANGE UP
-  MEROPENEM: SIGNIFICANT CHANGE UP
-  PIPERACILLIN/TAZOBACTAM: SIGNIFICANT CHANGE UP
-  TOBRAMYCIN: SIGNIFICANT CHANGE UP
-  TRIMETHOPRIM/SULFAMETHOXAZOLE: SIGNIFICANT CHANGE UP
ANION GAP SERPL CALC-SCNC: 12 MMOL/L — SIGNIFICANT CHANGE UP (ref 5–17)
APTT BLD: 29.6 SEC — SIGNIFICANT CHANGE UP (ref 27.5–36.3)
BASOPHILS # BLD AUTO: 0.02 K/UL — SIGNIFICANT CHANGE UP (ref 0–0.2)
BASOPHILS NFR BLD AUTO: 0.4 % — SIGNIFICANT CHANGE UP (ref 0–2)
BUN SERPL-MCNC: 8 MG/DL — SIGNIFICANT CHANGE UP (ref 7–23)
CALCIUM SERPL-MCNC: 8.3 MG/DL — LOW (ref 8.4–10.5)
CHLORIDE SERPL-SCNC: 102 MMOL/L — SIGNIFICANT CHANGE UP (ref 96–108)
CO2 SERPL-SCNC: 22 MMOL/L — SIGNIFICANT CHANGE UP (ref 22–31)
CREAT SERPL-MCNC: 0.86 MG/DL — SIGNIFICANT CHANGE UP (ref 0.5–1.3)
CULTURE RESULTS: NO GROWTH — SIGNIFICANT CHANGE UP
CULTURE RESULTS: SIGNIFICANT CHANGE UP
EOSINOPHIL # BLD AUTO: 0.15 K/UL — SIGNIFICANT CHANGE UP (ref 0–0.5)
EOSINOPHIL NFR BLD AUTO: 2.8 % — SIGNIFICANT CHANGE UP (ref 0–6)
GLUCOSE SERPL-MCNC: 123 MG/DL — HIGH (ref 70–99)
HCT VFR BLD CALC: 39.7 % — SIGNIFICANT CHANGE UP (ref 39–50)
HGB BLD-MCNC: 13.3 G/DL — SIGNIFICANT CHANGE UP (ref 13–17)
IMM GRANULOCYTES NFR BLD AUTO: 0.4 % — SIGNIFICANT CHANGE UP (ref 0–1.5)
INR BLD: 1.2 RATIO — HIGH (ref 0.88–1.16)
LYMPHOCYTES # BLD AUTO: 0.96 K/UL — LOW (ref 1–3.3)
LYMPHOCYTES # BLD AUTO: 18.1 % — SIGNIFICANT CHANGE UP (ref 13–44)
MCHC RBC-ENTMCNC: 31.4 PG — SIGNIFICANT CHANGE UP (ref 27–34)
MCHC RBC-ENTMCNC: 33.5 GM/DL — SIGNIFICANT CHANGE UP (ref 32–36)
MCV RBC AUTO: 93.9 FL — SIGNIFICANT CHANGE UP (ref 80–100)
METHOD TYPE: SIGNIFICANT CHANGE UP
METHOD TYPE: SIGNIFICANT CHANGE UP
MONOCYTES # BLD AUTO: 0.52 K/UL — SIGNIFICANT CHANGE UP (ref 0–0.9)
MONOCYTES NFR BLD AUTO: 9.8 % — SIGNIFICANT CHANGE UP (ref 2–14)
NEUTROPHILS # BLD AUTO: 3.62 K/UL — SIGNIFICANT CHANGE UP (ref 1.8–7.4)
NEUTROPHILS NFR BLD AUTO: 68.5 % — SIGNIFICANT CHANGE UP (ref 43–77)
NRBC # BLD: 0 /100 WBCS — SIGNIFICANT CHANGE UP (ref 0–0)
ORGANISM # SPEC MICROSCOPIC CNT: SIGNIFICANT CHANGE UP
PLATELET # BLD AUTO: 209 K/UL — SIGNIFICANT CHANGE UP (ref 150–400)
POTASSIUM SERPL-MCNC: 3.7 MMOL/L — SIGNIFICANT CHANGE UP (ref 3.5–5.3)
POTASSIUM SERPL-SCNC: 3.7 MMOL/L — SIGNIFICANT CHANGE UP (ref 3.5–5.3)
PROTHROM AB SERPL-ACNC: 13.9 SEC — HIGH (ref 10–12.9)
RBC # BLD: 4.23 M/UL — SIGNIFICANT CHANGE UP (ref 4.2–5.8)
RBC # FLD: 11.6 % — SIGNIFICANT CHANGE UP (ref 10.3–14.5)
SODIUM SERPL-SCNC: 136 MMOL/L — SIGNIFICANT CHANGE UP (ref 135–145)
SPECIMEN SOURCE: SIGNIFICANT CHANGE UP
SPECIMEN SOURCE: SIGNIFICANT CHANGE UP
VANCOMYCIN TROUGH SERPL-MCNC: 9.3 UG/ML — LOW (ref 10–20)
WBC # BLD: 5.29 K/UL — SIGNIFICANT CHANGE UP (ref 3.8–10.5)
WBC # FLD AUTO: 5.29 K/UL — SIGNIFICANT CHANGE UP (ref 3.8–10.5)

## 2019-12-27 PROCEDURE — 99232 SBSQ HOSP IP/OBS MODERATE 35: CPT | Mod: 25

## 2019-12-27 PROCEDURE — 99223 1ST HOSP IP/OBS HIGH 75: CPT

## 2019-12-27 PROCEDURE — 88305 TISSUE EXAM BY PATHOLOGIST: CPT | Mod: 26

## 2019-12-27 PROCEDURE — 99233 SBSQ HOSP IP/OBS HIGH 50: CPT

## 2019-12-27 PROCEDURE — 54115 TREATMENT OF PENIS LESION: CPT

## 2019-12-27 PROCEDURE — 76857 US EXAM PELVIC LIMITED: CPT | Mod: 26

## 2019-12-27 RX ORDER — AMPICILLIN SODIUM AND SULBACTAM SODIUM 250; 125 MG/ML; MG/ML
3 INJECTION, POWDER, FOR SUSPENSION INTRAMUSCULAR; INTRAVENOUS EVERY 6 HOURS
Refills: 0 | Status: DISCONTINUED | OUTPATIENT
Start: 2019-12-28 | End: 2019-12-29

## 2019-12-27 RX ORDER — ONDANSETRON 8 MG/1
4 TABLET, FILM COATED ORAL ONCE
Refills: 0 | Status: DISCONTINUED | OUTPATIENT
Start: 2019-12-27 | End: 2019-12-27

## 2019-12-27 RX ORDER — HYDROMORPHONE HYDROCHLORIDE 2 MG/ML
0.25 INJECTION INTRAMUSCULAR; INTRAVENOUS; SUBCUTANEOUS
Refills: 0 | Status: DISCONTINUED | OUTPATIENT
Start: 2019-12-27 | End: 2019-12-27

## 2019-12-27 RX ORDER — ACETAMINOPHEN 500 MG
650 TABLET ORAL EVERY 6 HOURS
Refills: 0 | Status: DISCONTINUED | OUTPATIENT
Start: 2019-12-27 | End: 2019-12-30

## 2019-12-27 RX ORDER — HYDROMORPHONE HYDROCHLORIDE 2 MG/ML
0.5 INJECTION INTRAMUSCULAR; INTRAVENOUS; SUBCUTANEOUS
Refills: 0 | Status: DISCONTINUED | OUTPATIENT
Start: 2019-12-27 | End: 2019-12-27

## 2019-12-27 RX ORDER — ONDANSETRON 8 MG/1
4 TABLET, FILM COATED ORAL EVERY 6 HOURS
Refills: 0 | Status: DISCONTINUED | OUTPATIENT
Start: 2019-12-27 | End: 2019-12-30

## 2019-12-27 RX ORDER — PIPERACILLIN AND TAZOBACTAM 4; .5 G/20ML; G/20ML
3.38 INJECTION, POWDER, LYOPHILIZED, FOR SOLUTION INTRAVENOUS EVERY 8 HOURS
Refills: 0 | Status: DISCONTINUED | OUTPATIENT
Start: 2019-12-27 | End: 2019-12-27

## 2019-12-27 RX ORDER — AMPICILLIN SODIUM AND SULBACTAM SODIUM 250; 125 MG/ML; MG/ML
INJECTION, POWDER, FOR SUSPENSION INTRAMUSCULAR; INTRAVENOUS
Refills: 0 | Status: DISCONTINUED | OUTPATIENT
Start: 2019-12-27 | End: 2019-12-29

## 2019-12-27 RX ORDER — ENOXAPARIN SODIUM 100 MG/ML
40 INJECTION SUBCUTANEOUS DAILY
Refills: 0 | Status: DISCONTINUED | OUTPATIENT
Start: 2019-12-27 | End: 2019-12-30

## 2019-12-27 RX ORDER — AMPICILLIN SODIUM AND SULBACTAM SODIUM 250; 125 MG/ML; MG/ML
3 INJECTION, POWDER, FOR SUSPENSION INTRAMUSCULAR; INTRAVENOUS ONCE
Refills: 0 | Status: DISCONTINUED | OUTPATIENT
Start: 2019-12-27 | End: 2019-12-29

## 2019-12-27 RX ORDER — VANCOMYCIN HCL 1 G
750 VIAL (EA) INTRAVENOUS EVERY 8 HOURS
Refills: 0 | Status: DISCONTINUED | OUTPATIENT
Start: 2019-12-27 | End: 2019-12-27

## 2019-12-27 RX ADMIN — Medication 250 MILLIGRAM(S): at 05:07

## 2019-12-27 RX ADMIN — PIPERACILLIN AND TAZOBACTAM 25 GRAM(S): 4; .5 INJECTION, POWDER, LYOPHILIZED, FOR SOLUTION INTRAVENOUS at 00:17

## 2019-12-27 RX ADMIN — PIPERACILLIN AND TAZOBACTAM 25 GRAM(S): 4; .5 INJECTION, POWDER, LYOPHILIZED, FOR SOLUTION INTRAVENOUS at 08:48

## 2019-12-27 NOTE — PROGRESS NOTE ADULT - ASSESSMENT
69 year old male with hx of HTN, penile prosthesis 6/19 s/p explant in [place] 8/19 presenting with fevers. CT shows stranding around remaining piece of prosthesis with small collection (partially imaged).    Unclear what exactly is still left behind in the corporal body.  Usually rear tip extenders are the retained fragment after penile prosthesis, but normally located in the deepest portion of the corpora.    - Penile US reviewed -> small collection and possible retained piece of reservoir anterior to pubic bone in the subQ space connecting to left corporal body  - Will plan for OR today for for penile exploration, possible penile prosthesis explantation  - Please pre-operatively clear patient  - Consent obtained  - Trend fevers  - c/w Vanc/Zosyn  - F/u cultures

## 2019-12-27 NOTE — PROGRESS NOTE ADULT - PROBLEM SELECTOR PLAN 1
2/2 urological retained foreign body from penile implant that was removed 8/2019 in [place].  Continue Vancomycin and Zosyn pending blood culture results as he recently had instrumentation for removal of implant within 3 months.   -Tylenol PRN fever  -F/U Repeat blood cultures  -OR today with urology for removal and possible draining of small abscess on US (0.3cm)

## 2019-12-27 NOTE — PROGRESS NOTE ADULT - PROBLEM SELECTOR PLAN 2
Gram variable rods in 1 blood culture bottle  Will send for repeat cultures and continue Vancomycin and Zosyn for now and F/U repeat blood cultures  Will need source control with retained foreign body, urology following and plan for OR today.

## 2019-12-27 NOTE — PROGRESS NOTE ADULT - PROBLEM SELECTOR PLAN 5
Transitions of Care Status:   1.  Name of PCP: Alec Durán in [place]  2.  PCP Contacted on Admission: [ ] Y    [ ] N    3.  PCP contacted at Discharge: [ ] Y    [ ] N    [ ] N/A  4.  Post-Discharge Appointment Date and Location:  5.  Summary of Handoff given to PCP:

## 2019-12-27 NOTE — CONSULT NOTE ADULT - SUBJECTIVE AND OBJECTIVE BOX
Marilynn Kraus - [phone]    Patient is a 69y old  Male who presents with a chief complaint of Fever, cough, hiccups (27 Dec 2019 12:57)    HPI:  69M with hypertension, prostate cancer s/p radical retropubic prostatectomy at [facility] , no further chemo/RT.  He underwent recent penile implant locally (Dr. Mcguire) in 2019.  Then developed pain/swelling and went to [place] for removal in Aug 2019.  had URI/fever/cough a week ago and improved.  Then again developed fever, non-productive cough, hiccups.  Here, he had pain base of penis, fever.  CT with small collection and retained foreign body.  Seen by  and confirmed by ultrasound.  Initially started on ceftriaxone/zithromax and broadened to vancomycin/zosyn.  BC with GNR that turned out to be acinotobacter radioresistans.  Repeat BC negative.    ID consulted to help management.      prior hospital charts reviewed [ x ]  primary team notes reviewed [ x ]  other consultant notes reviewed [ x ]    PAST MEDICAL & SURGICAL HISTORY:  Prostate cancer: denies chemo and radiation  Hypertension  Rotator cuff disorder/surgery   S/P prostatectomy:   S/P appendectomy: 20y/o    ANTIMICROBIALS:    Allergies  No Known Allergies    ANTIMICROBIALS:  azithromycin  IVPB (12/25 x1)  cefTRIAXone   IVPB (12/25 x1)  piperacillin/tazobactam IVPB.. 3.375 every 8 hours 91-)  vancomycin  IVPB 750 every 8 hours (-)    OTHER MEDS: MEDICATIONS  (STANDING):  acetaminophen   Tablet .. 650 every 6 hours PRN  ondansetron Injectable 4 every 6 hours PRN    SOCIAL HISTORY:   hx smoking, , lives with wife    FAMILY HISTORY:      REVIEW OF SYSTEMS  [  ] ROS unobtainable because:    [ x ] All other systems negative except as noted below:	    Constitutional:  [ x] fever [ ] chills  [ ] weight loss  [ ] weakness  Skin:  [ ] rash [ ] phlebitis	  Eyes: [ ] icterus [ ] pain  [ ] discharge	  ENMT: [ ] sore throat  [ ] thrush [ ] ulcers [ ] exudates  Respiratory: [ ] dyspnea [ ] hemoptysis [x ] cough [ ] sputum	  Cardiovascular:  [ ] chest pain [ ] palpitations [ ] edema	  Gastrointestinal:  [ ] nausea [ ] vomiting [ ] diarrhea [ ] constipation [x ] hiccups	  Genitourinary:  [ ] dysuria [ ] frequency [ ] hematuria [ ] discharge [ ] flank pain  [ x] pain base of penis  Musculoskeletal:  [ ] myalgias [ ] arthralgias [ ] arthritis  [ ] back pain  Neurological:  [ ] headache [ ] seizures  [ ] confusion/altered mental status  Psychiatric:  [ ] anxiety [ ] depression	  Hematology/Lymphatics:  [ ] lymphadenopathy  Endocrine:  [ ] adrenal [ ] thyroid  Allergic/Immunologic:	 [ ] transplant [ ] seasonal    Vital Signs Last 24 Hrs  T(F): 97.8 (19 @ 12:18), Max: 102.9 (19 @ 05:42)  Vital Signs Last 24 Hrs  HR: 68 (19 @ 12:18) (66 - 74)  BP: 137/89 (19 @ 12:18) (118/77 - 137/89)  RR: 18 (19 @ 12:18)  SpO2: 94% (19 @ 12:18) (94% - 96%)  Wt(kg): --    PHYSICAL EXAM:  Constitutional: non-toxic, no distress, awake  HEAD/EYES: atraumatic, anicteric, no conjunctival injection  ENT:  supple, no sores, no thrush  Cardiovascular:   normal S1, S2, no murmur, no edema  Respiratory:  equal breath sounds, no wheezes, no rales  GI:  soft, non-tender, normal bowel sounds  :  no stephenson, minimal tenderness near pubic symphysis, no overt fluctuance  Musculoskeletal:  no synovitis, normal ROM  Neurologic: awake and alert,  normal strength, no focal findings  Skin:  no rash, no erythema, no phlebitis  Psychiatric:  awake, alert, appropriate mood    WBC Count: 5.29 (19 @ 07:02)  WBC Count: 8.31 (19 @ 08:51)  WBC Count: 11.37 (19 @ 06:30)                        13.3   5.29  )-----------( 209      ( 27 Dec 2019 07:02 )             39.7     136  |  102  |  8   ----------------------------<  123<H>  3.7   |  22  |  0.86    Ca    8.3<L>      27 Dec 2019 06:56  Mg     2.3         TPro  6.8  /  Alb  2.9<L>  /  TBili  0.5  /  DBili  x   /  AST  31  /  ALT  25  /  AlkPhos  48      Urinalysis Basic - ( 25 Dec 2019 15:29 )  Color: Yellow / Appearance: Clear / S.016 / pH: x  Gluc: x / Ketone: Trace  / Bili: Negative / Urobili: <2 mg/dL   Blood: x / Protein: Trace / Nitrite: Negative   Leuk Esterase: Negative / RBC: 1 /HPF / WBC 2 /HPF   Sq Epi: x / Non Sq Epi: 1 /HPF / Bacteria: Negative    MICROBIOLOGY:  Culture - Blood (collected 26 Dec 2019 10:03)  Source: .Blood Blood-Peripheral  Preliminary Report (27 Dec 2019 11:01):    No growth to date.    Culture - Blood (collected 26 Dec 2019 10:03)  Source: .Blood Blood-Peripheral  Preliminary Report (27 Dec 2019 11:01):    No growth to date.    Culture - Blood (collected 25 Dec 2019 09:05)  Source: .Blood Blood-Peripheral  Preliminary Report (26 Dec 2019 10:01):    No growth to date.    Culture - Blood (collected 25 Dec 2019 09:05)  Source: .Blood Blood-Peripheral  Gram Stain (26 Dec 2019 02:30):    Growth in aerobic bottle: Gram Variable Rods  Preliminary Report (26 Dec 2019 23:57):    Growth in aerobic bottle: Acinetobacter radioresistens    Rapid RVP Result: NotDetec ( @ 06:30)    RADIOLOGY:  imaging below personally reviewed    US Doppler Scrotum (19 @ 14:45) >  IMPRESSION:  Tiny fluid collection measuring 0.2 x 0.3 cm adjacent to the left corpora cavernosa at the base of the penis.  No fluid collection identified adjacent to tubular foreign body in the mid pubic symphysis area.  Bilateral small hydrocele and bilateral varicocele as described above.    CT Abdomen and Pelvis w/ IV Cont (19 @ 15:05) >  IMPRESSION:  Minimal patchy consolidation in the lingula.  No acute intra-abdominal pathology.  Foreign body noted within the base of the penis with surrounding stranding. On image 170 of series 3, there is a small fluid collection noted but this is only partially imaged. Correlate with physical exam for abscess/cellulitis.    Xray Chest 1 View AP/PA (19 @ 06:46) >  IMPRESSION:  Clear lungs.

## 2019-12-27 NOTE — PRE-ANESTHESIA EVALUATION ADULT - NSANTHOSAYNRD_GEN_A_CORE
No. HEAVENLY screening performed.  STOP BANG Legend: 0-2 = LOW Risk; 3-4 = INTERMEDIATE Risk; 5-8 = HIGH Risk
No. HEAVENLY screening performed.  STOP BANG Legend: 0-2 = LOW Risk; 3-4 = INTERMEDIATE Risk; 5-8 = HIGH Risk

## 2019-12-27 NOTE — PROGRESS NOTE ADULT - SUBJECTIVE AND OBJECTIVE BOX
PROGRESS NOTE:     Patient is a 69y old  Male who presents with a chief complaint of Fever, cough, hiccups (27 Dec 2019 11:26)      SUBJECTIVE / OVERNIGHT EVENTS: Still complaining of hiccups     ADDITIONAL REVIEW OF SYSTEMS:  No n/v/d  No chest pain or SOB    MEDICATIONS  (STANDING):  piperacillin/tazobactam IVPB.. 3.375 Gram(s) IV Intermittent every 8 hours  vancomycin  IVPB      vancomycin  IVPB 750 milliGRAM(s) IV Intermittent every 8 hours    MEDICATIONS  (PRN):  acetaminophen   Tablet .. 650 milliGRAM(s) Oral every 6 hours PRN Mild Pain (1 - 3)  ondansetron Injectable 4 milliGRAM(s) IV Push every 6 hours PRN Nausea      CAPILLARY BLOOD GLUCOSE        I&O's Summary    26 Dec 2019 07:01  -  27 Dec 2019 07:00  --------------------------------------------------------  IN: 590 mL / OUT: 0 mL / NET: 590 mL        PHYSICAL EXAM:  Vital Signs Last 24 Hrs  T(C): 36.6 (27 Dec 2019 12:18), Max: 37.2 (26 Dec 2019 13:35)  T(F): 97.8 (27 Dec 2019 12:18), Max: 99 (26 Dec 2019 13:35)  HR: 68 (27 Dec 2019 12:18) (66 - 76)  BP: 137/89 (27 Dec 2019 12:18) (118/77 - 137/89)  BP(mean): --  RR: 18 (27 Dec 2019 12:18) (18 - 19)  SpO2: 94% (27 Dec 2019 12:18) (94% - 97%)    CONSTITUTIONAL: NAD, well-developed, non toxic  RESPIRATORY: Normal respiratory effort; lungs are clear to auscultation bilaterally  CARDIOVASCULAR: Regular rate and rhythm, normal S1 and S2, no murmur/rub/gallop; No lower extremity edema; Peripheral pulses are 2+ bilaterally  ABDOMEN: Nontender to palpation, normoactive bowel sounds, no rebound/guarding; No hepatosplenomegaly  MUSCLOSKELETAL: no clubbing or cyanosis of digits; no joint swelling or tenderness to palpation  PSYCH: A+O to person, place, and time; affect appropriate    LABS:                        13.3   5.29  )-----------( 209      ( 27 Dec 2019 07:02 )             39.7         136  |  102  |  8   ----------------------------<  123<H>  3.7   |  22  |  0.86    Ca    8.3<L>      27 Dec 2019 06:56  Mg     2.3         TPro  6.8  /  Alb  2.9<L>  /  TBili  0.5  /  DBili  x   /  AST  31  /  ALT  25  /  AlkPhos  48      PT/INR - ( 27 Dec 2019 07:09 )   PT: 13.9 sec;   INR: 1.20 ratio         PTT - ( 27 Dec 2019 07:09 )  PTT:29.6 sec      Urinalysis Basic - ( 25 Dec 2019 15:29 )    Color: Yellow / Appearance: Clear / S.016 / pH: x  Gluc: x / Ketone: Trace  / Bili: Negative / Urobili: <2 mg/dL   Blood: x / Protein: Trace / Nitrite: Negative   Leuk Esterase: Negative / RBC: 1 /HPF / WBC 2 /HPF   Sq Epi: x / Non Sq Epi: 1 /HPF / Bacteria: Negative        Culture - Blood (collected 26 Dec 2019 10:03)  Source: .Blood Blood-Peripheral  Preliminary Report (27 Dec 2019 11:01):    No growth to date.    Culture - Blood (collected 26 Dec 2019 10:03)  Source: .Blood Blood-Peripheral  Preliminary Report (27 Dec 2019 11:01):    No growth to date.    Culture - Blood (collected 25 Dec 2019 09:05)  Source: .Blood Blood-Peripheral  Preliminary Report (26 Dec 2019 10:01):    No growth to date.    Culture - Blood (collected 25 Dec 2019 09:05)  Source: .Blood Blood-Peripheral  Gram Stain (26 Dec 2019 02:30):    Growth in aerobic bottle: Gram Variable Rods  Preliminary Report (26 Dec 2019 23:57):    Growth in aerobic bottle: Acinetobacter radioresistens    "Due to technical problems, Proteus sp. will Not be reported as part of    the BCID panel until further notice"    ***Blood Panel PCR results on this specimen are available    approximately 3 hours after the Gram stain result.***    Gram stain, PCR, and/or culture results may not always    correspond due to difference in methodologies.    ************************************************************    This PCR assay was performed using [employer].    The following targets are tested for: Enterococcus,    vancomycin resistant enterococci, Listeria monocytogenes,    coagulase negative staphylococci, S. aureus,    methicillin resistant S. aureus, Streptococcus agalactiae    (Group B), S. pneumoniae, S. pyogenes (Group A),    Acinetobacter baumannii, Enterobacter cloacae, E. coli,    Klebsiella oxytoca, K. pneumoniae, Proteus sp.,    Serratia marcescens, Haemophilus influenzae,    Neisseria meningitidis, Pseudomonas aeruginosa, Candida    albicans, C. glabrata, C krusei, C parapsilosis,    C. tropicalis and the KPC resistance gene.  Organism: Blood Culture PCR (26 Dec 2019 03:55)  Organism: Blood Culture PCR (26 Dec 2019 03:55)        RADIOLOGY & ADDITIONAL TESTS:  Results Reviewed:   Imaging Personally Reviewed:  Electrocardiogram Personally Reviewed:    COORDINATION OF CARE:  Care Discussed with Consultants/Other Providers [Y/N]:  Prior or Outpatient Records Reviewed [Y/N]:

## 2019-12-27 NOTE — PROGRESS NOTE ADULT - SUBJECTIVE AND OBJECTIVE BOX
Subjective    Seen & examined at bedside  No acute events overnight  Planning for OR today for retained penile prosthesis fragment    Objective    Vital signs  T(F): , Max: 99 (12-26-19 @ 13:35)  HR: 66 (12-27-19 @ 05:13)  BP: 121/76 (12-27-19 @ 05:13)  SpO2: 95% (12-27-19 @ 05:13)  Wt(kg): --    Output     12-26 @ 07:01  -  12-27 @ 07:00  --------------------------------------------------------  IN: 590 mL / OUT: 0 mL / NET: 590 mL    Physical Exam  Gen NAD  Pulm No respiratory distress  GI S/ND/NT   Mild scrotal erythema, Firmness to penis and base, No crepitus or tenderness    Labs  12-27 @ 07:02  WBC 5.29  / Hct 39.7  / SCr --       12-27 @ 06:56  WBC --    / Hct --    / SCr 0.86     Imaging    < from: US Doppler Scrotum (12.26.19 @ 14:45) >  IMPRESSION:     Tiny fluid collection measuring 0.2 x 0.3 cm adjacent to the left corpora cavernosa at the base of the penis.     No fluid collection identified adjacent to tubular foreign body in the mid pubic symphysis area.    Bilateral small hydrocele and bilateral varicocele as described above.    < end of copied text >

## 2019-12-27 NOTE — PROGRESS NOTE ADULT - ASSESSMENT
70 YO male with H/O of HTN, Reported Prostate CA , recent Penile implant surgery in 6/2019 with removal of the implant in [place] in 8 /2019 admitted for retained foreign body from implant causing sepsis.

## 2019-12-27 NOTE — PROGRESS NOTE ADULT - SUBJECTIVE AND OBJECTIVE BOX
Post op Check    Pt seen and examined without complaints. Pain is controlled. Denies SOB/CP/N/V.     Vital Signs Last 24 Hrs  T(C): 36.4 (27 Dec 2019 21:50), Max: 36.9 (27 Dec 2019 00:30)  T(F): 97.6 (27 Dec 2019 21:50), Max: 98.4 (27 Dec 2019 00:30)  HR: 78 (27 Dec 2019 21:50) (62 - 80)  BP: 120/78 (27 Dec 2019 21:50) (118/77 - 155/99)  BP(mean): 103 (27 Dec 2019 18:30) (98 - 120)  RR: 18 (27 Dec 2019 21:50) (14 - 18)  SpO2: 95% (27 Dec 2019 21:50) (94% - 99%)    I&O's Summary    26 Dec 2019 07:01  -  27 Dec 2019 07:00  --------------------------------------------------------  IN: 590 mL / OUT: 0 mL / NET: 590 mL    27 Dec 2019 07:01  -  28 Dec 2019 00:00  --------------------------------------------------------  IN: 320 mL / OUT: 200 mL / NET: 120 mL        Physical Exam  Gen: NAD  Abd: Soft, NT, ND  Back: No CVAT b/l  : suprapubic dressing lightly stained, intact                          13.3   5.29  )-----------( 209      ( 27 Dec 2019 07:02 )             39.7       12-27    136  |  102  |  8   ----------------------------<  123<H>  3.7   |  22  |  0.86    Ca    8.3<L>      27 Dec 2019 06:56  Mg     2.3     12-26    TPro  6.8  /  Alb  2.9<L>  /  TBili  0.5  /  DBili  x   /  AST  31  /  ALT  25  /  AlkPhos  48  12-26      A/P: 69y Male s/p I&D and removal of retained penile prosthesis     -f/u wound culture  -qd dressing changes  -complete plan as per primary team

## 2019-12-27 NOTE — CONSULT NOTE ADULT - ASSESSMENT
69M htn, prostate surgery s/p radical retropubic prostatectomy 2011, recent penile implant 6/2019 c/b infection s/p removal 8/2019 now with fever, bacteremia and cough and incidental retained foreign body and penile abscess.  Unclear if acinetobacter from respiratory tract or abscess.    Fever, bacteremia, penile abscess and retained foreign body. Cough, post-nasal drip - no clear pneumonia      suggest:  - d/c vancomycin  - change zosyn to unasyn 3g q6  - f/u sensitivities  - will tailor antibiotics once sensitivities are available - hopefully levaquin upon discharge  - please send OR cultures of abscess    Please call the ID service [phone] with questions or concerns over the weekend    I will be away returning 1/2/20.  ID available.  Please call [phone].

## 2019-12-27 NOTE — PROGRESS NOTE ADULT - PROBLEM SELECTOR PLAN 3
Had penile implant placed in 6/2019 and subsequently had it removed due to infection in [place] in 8/2019.  He has been feeling well since but now fevers and on CT has retained portion.  Will Continue Vancomycin and Zosyn for now and plan as above

## 2019-12-28 LAB
HCT VFR BLD CALC: 41.6 % — SIGNIFICANT CHANGE UP (ref 39–50)
HGB BLD-MCNC: 13.6 G/DL — SIGNIFICANT CHANGE UP (ref 13–17)
MCHC RBC-ENTMCNC: 31.3 PG — SIGNIFICANT CHANGE UP (ref 27–34)
MCHC RBC-ENTMCNC: 32.7 GM/DL — SIGNIFICANT CHANGE UP (ref 32–36)
MCV RBC AUTO: 95.6 FL — SIGNIFICANT CHANGE UP (ref 80–100)
NRBC # BLD: 0 /100 WBCS — SIGNIFICANT CHANGE UP (ref 0–0)
PLATELET # BLD AUTO: 263 K/UL — SIGNIFICANT CHANGE UP (ref 150–400)
RBC # BLD: 4.35 M/UL — SIGNIFICANT CHANGE UP (ref 4.2–5.8)
RBC # FLD: 11.5 % — SIGNIFICANT CHANGE UP (ref 10.3–14.5)
WBC # BLD: 6.44 K/UL — SIGNIFICANT CHANGE UP (ref 3.8–10.5)
WBC # FLD AUTO: 6.44 K/UL — SIGNIFICANT CHANGE UP (ref 3.8–10.5)

## 2019-12-28 PROCEDURE — 99024 POSTOP FOLLOW-UP VISIT: CPT

## 2019-12-28 PROCEDURE — 99233 SBSQ HOSP IP/OBS HIGH 50: CPT

## 2019-12-28 RX ADMIN — AMPICILLIN SODIUM AND SULBACTAM SODIUM 200 GRAM(S): 250; 125 INJECTION, POWDER, FOR SUSPENSION INTRAMUSCULAR; INTRAVENOUS at 06:15

## 2019-12-28 RX ADMIN — AMPICILLIN SODIUM AND SULBACTAM SODIUM 200 GRAM(S): 250; 125 INJECTION, POWDER, FOR SUSPENSION INTRAMUSCULAR; INTRAVENOUS at 23:04

## 2019-12-28 RX ADMIN — AMPICILLIN SODIUM AND SULBACTAM SODIUM 200 GRAM(S): 250; 125 INJECTION, POWDER, FOR SUSPENSION INTRAMUSCULAR; INTRAVENOUS at 18:04

## 2019-12-28 RX ADMIN — ENOXAPARIN SODIUM 40 MILLIGRAM(S): 100 INJECTION SUBCUTANEOUS at 11:57

## 2019-12-28 RX ADMIN — AMPICILLIN SODIUM AND SULBACTAM SODIUM 200 GRAM(S): 250; 125 INJECTION, POWDER, FOR SUSPENSION INTRAMUSCULAR; INTRAVENOUS at 00:13

## 2019-12-28 RX ADMIN — AMPICILLIN SODIUM AND SULBACTAM SODIUM 200 GRAM(S): 250; 125 INJECTION, POWDER, FOR SUSPENSION INTRAMUSCULAR; INTRAVENOUS at 11:57

## 2019-12-28 NOTE — PROGRESS NOTE ADULT - PROBLEM SELECTOR PLAN 3
Had penile implant placed in 6/2019 and subsequently had it removed due to infection in [place] in 8/2019.  He has been feeling well since but now fevers and on CT has retained portion.  -s/p exploration and foreign body removal 12/27

## 2019-12-28 NOTE — PROGRESS NOTE ADULT - SUBJECTIVE AND OBJECTIVE BOX
Patient is a 69y old  Male who presents with a chief complaint of Fever, cough, hiccups (28 Dec 2019 08:17)      SUBJECTIVE / OVERNIGHT EVENTS:  no acute events overnight, vss, afebrile  s/p exploration and foreign body removal yesterday without complication  drain with minimal output  pt ambulating well without issues    ROS:  14 point ROS negative in detail except stated as above    MEDICATIONS  (STANDING):  ampicillin/sulbactam  IVPB 3 Gram(s) IV Intermittent once  ampicillin/sulbactam  IVPB      ampicillin/sulbactam  IVPB 3 Gram(s) IV Intermittent every 6 hours  enoxaparin Injectable 40 milliGRAM(s) SubCutaneous daily    MEDICATIONS  (PRN):  acetaminophen   Tablet .. 650 milliGRAM(s) Oral every 6 hours PRN Mild Pain (1 - 3)  ondansetron Injectable 4 milliGRAM(s) IV Push every 6 hours PRN Nausea      CAPILLARY BLOOD GLUCOSE        I&O's Summary    27 Dec 2019 07:01  -  28 Dec 2019 07:00  --------------------------------------------------------  IN: 560 mL / OUT: 400 mL / NET: 160 mL        PHYSICAL EXAM:  Vital Signs Last 24 Hrs  T(C): 36.5 (28 Dec 2019 08:54), Max: 36.8 (27 Dec 2019 14:42)  T(F): 97.7 (28 Dec 2019 08:54), Max: 98.2 (27 Dec 2019 14:42)  HR: 67 (28 Dec 2019 05:34) (62 - 80)  BP: 123/84 (28 Dec 2019 05:34) (119/81 - 155/99)  BP(mean): 103 (27 Dec 2019 18:30) (98 - 120)  RR: 18 (28 Dec 2019 08:54) (14 - 18)  SpO2: 95% (28 Dec 2019 08:54) (94% - 99%)    GENERAL: NAD, well-developed  HEAD:  Atraumatic, Normocephalic  EYES: EOMI, PERRLA, conjunctiva and sclera clear  NECK: Supple, No JVD  CHEST/LUNG: Clear to auscultation bilaterally; No wheeze  HEART: Regular rate and rhythm; No murmurs, rubs, or gallops  ABDOMEN: Soft, Nontender, Nondistended; Bowel sounds present  : penrose drain in place with minimal output  EXTREMITIES:  2+ Peripheral Pulses, No clubbing, cyanosis, or edema  NEUROLOGY: AAOx3; non-focal  SKIN: No rashes or lesions    LABS:  personally reviewed                        13.6   6.44  )-----------( 263      ( 28 Dec 2019 07:17 )             41.6     12-27    136  |  102  |  8   ----------------------------<  123<H>  3.7   |  22  |  0.86    Ca    8.3<L>      27 Dec 2019 06:56      PT/INR - ( 27 Dec 2019 07:09 )   PT: 13.9 sec;   INR: 1.20 ratio         PTT - ( 27 Dec 2019 07:09 )  PTT:29.6 sec    Culture - Urine (12.27.19 @ 03:17)    Specimen Source: .Urine Clean Catch (Midstream)    Culture Results:   No growth    Culture - Blood (12.26.19 @ 17:38)    Specimen Source: .Blood Blood-Peripheral    Culture Results:   No growth to date.    Culture - Blood (12.26.19 @ 17:38)    Specimen Source: .Blood Blood-Peripheral    Culture Results:   No growth to date.      Culture - Blood (12.25.19 @ 09:05)    Gram Stain:   Growth in aerobic bottle: Gram Variable Rods    -  Amikacin: S <=16    -  Ampicillin/Sulbactam: S <=4/2    -  Cefepime: S <=2    -  Ceftazidime: S <=1    -  Ciprofloxacin: S <=1    -  Gentamicin: S <=2    -  Imipenem: S    -  Levofloxacin: S <=2    -  Meropenem: S <=1    -  Piperacillin/Tazobactam: S    -  Tobramycin: S <=2    -  Trimethoprim/Sulfamethoxazole: S <=2/38    -  Multidrug (KPC pos) resistant organism: Nondet    -  Staphylococcus aureus: Nondet    -  Methicillin resistant Staphylococcus aureus (MRSA): Nondet    -  Coagulase negative Staphylococcus: Nondet    -  Enterococcus species: Nondet    -  Vancomycin resistant Enterococcus sp.: Nondet    -  Escherichia coli: Nondet    -  Klebsiella oxytoca: Nondet    -  Klebsiella pneumoniae: Nondet    -  Serratia marcescens: Nondet    -  Haemophilus influenzae: Nondet    -  Listeria monocytogenes: Nondet    -  Neisseria meningitidis: Nondet    -  Pseudomonas aeruginosa: Nondet    -  Acinetobacter baumanii: Nondet    -  Enterobacter cloacae complex: Nondet    -  Streptococcus sp. (Not Grp A, B or S pneumoniae): Nondet    -  Streptococcus agalactiae (Group B): Nondet    -  Streptococcus pyogenes (Group A): Nondet    -  Streptococcus pneumoniae: Nondet    -  Candida albicans: Nondet    -  Candida glabrata: Nondet    -  Candida krusei: Nondet    -  Candida parapsilosis: Nondet    -  Candida tropicalis: Nondet    Specimen Source: .Blood Blood-Peripheral    Organism: Blood Culture PCR    Organism: Acinetobacter radioresistens    Organism: Acinetobacter radioresistens    Culture Results:   Growth in aerobic bottle: Acinetobacter radioresistens  "Due to technical problems, Proteus sp. will Not be reported as part of  the BCID panel until further notice"  ***Blood Panel PCR results on this specimen are available  approximately 3 hours after the Gram stain result.***  Gram stain, PCR, and/or culture results may not always  correspond due to difference in methodologies.  ************************************************************  This PCR assay was performed using [employer].  The following targets are tested for: Enterococcus,  vancomycin resistant enterococci, Listeria monocytogenes,  coagulase negative staphylococci, S. aureus,  methicillin resistant S. aureus, Streptococcus agalactiae  (Group B), S. pneumoniae, S. pyogenes (Group A),  Acinetobacter baumannii, Enterobacter cloacae, E. coli,  Klebsiella oxytoca, K. pneumoniae, Proteus sp.,  Serratia marcescens, Haemophilus influenzae,  Neisseria meningitidis, Pseudomonas aeruginosa, Candida  albicans, C. glabrata, C krusei, C parapsilosis,  C. tropicalis and the KPC resistance gene.    Organism Identification: Blood Culture PCR  Acinetobacter radioresistens    Method Type: PCR    Method Type: ARABELLA    Method Type: KB    RADIOLOGY & ADDITIONAL TESTS:    Imaging Personally Reviewed:    Consultant(s) Notes Reviewed:  urology    Care Discussed with Consultants/Other Providers:

## 2019-12-28 NOTE — PROGRESS NOTE ADULT - ASSESSMENT
68 YO male with H/O of HTN, Reported Prostate CA , recent Penile implant surgery in 6/2019 with removal of the implant in [place] in 8 /2019 admitted for retained foreign body from implant causing sepsis, s/p exploration and foreign body removal (12/27), s/p panrose drain

## 2019-12-28 NOTE — PROGRESS NOTE ADULT - PROBLEM SELECTOR PLAN 1
2/2 urological retained foreign body from penile implant that was removed 8/2019 in [place].  -s/p exploration and foreign body removal  -continue unasyn  -fu cultures

## 2019-12-28 NOTE — PROGRESS NOTE ADULT - ASSESSMENT
69 year old male with hx of HTN, penile prosthesis 6/19 s/p explant in [place] 8/19 presenting with fevers. CT shows stranding around remaining piece of prosthesis with small collection (partially imaged), s/p exploration and foreign body removal (12/27).    - Trend fevers  - c/w Unasyn  - F/u cultures - wound culture from OR sent  - c/w drain for now, will consider removal in 48-72 hours 69 year old male with hx of HTN, penile prosthesis 6/19 s/p explant in [place] 8/19 presenting with fevers. CT shows stranding around remaining piece of prosthesis with small collection (partially imaged), s/p exploration and foreign body removal (12/27).    - Trend fevers  - c/w Unasyn  - F/u cultures - wound culture from OR sent, will compare once available to single positive blood culture  - c/w drain for now, will consider removal in 48-72 hours

## 2019-12-28 NOTE — PROGRESS NOTE ADULT - PROBLEM SELECTOR PLAN 2
Gram variable rods in 1 blood culture bottle - no further positive BCx since then  -fu OR culture  -continue unasyn  -appreciate ID recs

## 2019-12-28 NOTE — PROGRESS NOTE ADULT - SUBJECTIVE AND OBJECTIVE BOX
Subjective    Feeling well, no complaints. Denies pain, fever/chills.    Objective    Vital signs  T(F): , Max: 98.2 (12-27-19 @ 14:42)  HR: 67 (12-28-19 @ 05:34)  BP: 123/84 (12-28-19 @ 05:34)  SpO2: 95% (12-28-19 @ 05:34)  Wt(kg): --    Output     OUT:    Voided: 400 mL  Total OUT: 400 mL    Total NET: -400 mL          Physical Exam  Gen NAD  Abd soft, NT, ND   suprapubic penrose drain in place - ss output, no purulence or surrounding erythema    Labs      12-28 @ 07:17    WBC 6.44  / Hct 41.6  / SCr --       12-27 @ 07:02    WBC 5.29  / Hct 39.7  / SCr -- Subjective    Feeling well, no complaints. Denies pain, fever/chills.    Objective    Vital signs  T(F): , Max: 98.2 (12-27-19 @ 14:42)  HR: 67 (12-28-19 @ 05:34)  BP: 123/84 (12-28-19 @ 05:34)  SpO2: 95% (12-28-19 @ 05:34)  Wt(kg): --    Output     OUT:    Voided: 400 mL  Total OUT: 400 mL    Total NET: -400 mL          Physical Exam  Gen NAD  Abd soft, NT, ND   suprapubic penrose drain in place - ss output, no purulence or surrounding erythema  output minimal    Labs      12-28 @ 07:17    WBC 6.44  / Hct 41.6  / SCr --       12-27 @ 07:02    WBC 5.29  / Hct 39.7  / SCr --

## 2019-12-29 DIAGNOSIS — K21.9 GASTRO-ESOPHAGEAL REFLUX DISEASE WITHOUT ESOPHAGITIS: ICD-10-CM

## 2019-12-29 LAB
-  AMIKACIN: SIGNIFICANT CHANGE UP
-  AZTREONAM: SIGNIFICANT CHANGE UP
-  CEFEPIME: SIGNIFICANT CHANGE UP
-  CEFTAZIDIME: SIGNIFICANT CHANGE UP
-  CIPROFLOXACIN: SIGNIFICANT CHANGE UP
-  GENTAMICIN: SIGNIFICANT CHANGE UP
-  IMIPENEM: SIGNIFICANT CHANGE UP
-  LEVOFLOXACIN: SIGNIFICANT CHANGE UP
-  MEROPENEM: SIGNIFICANT CHANGE UP
-  PIPERACILLIN/TAZOBACTAM: SIGNIFICANT CHANGE UP
-  TOBRAMYCIN: SIGNIFICANT CHANGE UP
ANION GAP SERPL CALC-SCNC: 10 MMOL/L — SIGNIFICANT CHANGE UP (ref 5–17)
BUN SERPL-MCNC: 16 MG/DL — SIGNIFICANT CHANGE UP (ref 7–23)
CALCIUM SERPL-MCNC: 8.5 MG/DL — SIGNIFICANT CHANGE UP (ref 8.4–10.5)
CHLORIDE SERPL-SCNC: 104 MMOL/L — SIGNIFICANT CHANGE UP (ref 96–108)
CO2 SERPL-SCNC: 25 MMOL/L — SIGNIFICANT CHANGE UP (ref 22–31)
CREAT SERPL-MCNC: 0.86 MG/DL — SIGNIFICANT CHANGE UP (ref 0.5–1.3)
CULTURE RESULTS: SIGNIFICANT CHANGE UP
GLUCOSE SERPL-MCNC: 94 MG/DL — SIGNIFICANT CHANGE UP (ref 70–99)
HCT VFR BLD CALC: 38.2 % — LOW (ref 39–50)
HGB BLD-MCNC: 12.7 G/DL — LOW (ref 13–17)
MCHC RBC-ENTMCNC: 31.6 PG — SIGNIFICANT CHANGE UP (ref 27–34)
MCHC RBC-ENTMCNC: 33.2 GM/DL — SIGNIFICANT CHANGE UP (ref 32–36)
MCV RBC AUTO: 95 FL — SIGNIFICANT CHANGE UP (ref 80–100)
METHOD TYPE: SIGNIFICANT CHANGE UP
NRBC # BLD: 0 /100 WBCS — SIGNIFICANT CHANGE UP (ref 0–0)
ORGANISM # SPEC MICROSCOPIC CNT: SIGNIFICANT CHANGE UP
ORGANISM # SPEC MICROSCOPIC CNT: SIGNIFICANT CHANGE UP
PLATELET # BLD AUTO: 268 K/UL — SIGNIFICANT CHANGE UP (ref 150–400)
POTASSIUM SERPL-MCNC: 4 MMOL/L — SIGNIFICANT CHANGE UP (ref 3.5–5.3)
POTASSIUM SERPL-SCNC: 4 MMOL/L — SIGNIFICANT CHANGE UP (ref 3.5–5.3)
RBC # BLD: 4.02 M/UL — LOW (ref 4.2–5.8)
RBC # FLD: 11.6 % — SIGNIFICANT CHANGE UP (ref 10.3–14.5)
SODIUM SERPL-SCNC: 139 MMOL/L — SIGNIFICANT CHANGE UP (ref 135–145)
SPECIMEN SOURCE: SIGNIFICANT CHANGE UP
WBC # BLD: 8.1 K/UL — SIGNIFICANT CHANGE UP (ref 3.8–10.5)
WBC # FLD AUTO: 8.1 K/UL — SIGNIFICANT CHANGE UP (ref 3.8–10.5)

## 2019-12-29 PROCEDURE — 99024 POSTOP FOLLOW-UP VISIT: CPT

## 2019-12-29 PROCEDURE — 99233 SBSQ HOSP IP/OBS HIGH 50: CPT

## 2019-12-29 RX ORDER — FAMOTIDINE 10 MG/ML
20 INJECTION INTRAVENOUS ONCE
Refills: 0 | Status: COMPLETED | OUTPATIENT
Start: 2019-12-29 | End: 2019-12-29

## 2019-12-29 RX ORDER — PIPERACILLIN AND TAZOBACTAM 4; .5 G/20ML; G/20ML
3.38 INJECTION, POWDER, LYOPHILIZED, FOR SOLUTION INTRAVENOUS EVERY 8 HOURS
Refills: 0 | Status: DISCONTINUED | OUTPATIENT
Start: 2019-12-29 | End: 2019-12-30

## 2019-12-29 RX ORDER — PANTOPRAZOLE SODIUM 20 MG/1
40 TABLET, DELAYED RELEASE ORAL
Refills: 0 | Status: DISCONTINUED | OUTPATIENT
Start: 2019-12-29 | End: 2019-12-30

## 2019-12-29 RX ORDER — PIPERACILLIN AND TAZOBACTAM 4; .5 G/20ML; G/20ML
3.38 INJECTION, POWDER, LYOPHILIZED, FOR SOLUTION INTRAVENOUS ONCE
Refills: 0 | Status: COMPLETED | OUTPATIENT
Start: 2019-12-29 | End: 2019-12-29

## 2019-12-29 RX ADMIN — FAMOTIDINE 20 MILLIGRAM(S): 10 INJECTION INTRAVENOUS at 11:18

## 2019-12-29 RX ADMIN — PIPERACILLIN AND TAZOBACTAM 200 GRAM(S): 4; .5 INJECTION, POWDER, LYOPHILIZED, FOR SOLUTION INTRAVENOUS at 11:18

## 2019-12-29 RX ADMIN — ENOXAPARIN SODIUM 40 MILLIGRAM(S): 100 INJECTION SUBCUTANEOUS at 11:18

## 2019-12-29 RX ADMIN — AMPICILLIN SODIUM AND SULBACTAM SODIUM 200 GRAM(S): 250; 125 INJECTION, POWDER, FOR SUSPENSION INTRAMUSCULAR; INTRAVENOUS at 05:20

## 2019-12-29 RX ADMIN — PIPERACILLIN AND TAZOBACTAM 25 GRAM(S): 4; .5 INJECTION, POWDER, LYOPHILIZED, FOR SOLUTION INTRAVENOUS at 22:32

## 2019-12-29 NOTE — PROGRESS NOTE ADULT - ASSESSMENT
69 year old male with hx of HTN, penile prosthesis 6/19 s/p explant in [place] 8/19 presenting with fevers. CT shows stranding around remaining piece of prosthesis with small collection (partially imaged), s/p exploration and foreign body removal (12/27).  Afebrile. WBC wnl    - Recommend changing or adding antibiotic due to OR culture positive for Pseudomonas  - Followup culture sensitivity   - Continue with penrose drain 69 year old male with hx of HTN, penile prosthesis 6/19 s/p explant in [place] 8/19 presenting with fevers. CT shows stranding around remaining piece of prosthesis with small collection (partially imaged), s/p exploration and foreign body removal (12/27).  Afebrile. WBC wnl    - Recommend changing or adding antibiotic due to OR culture positive for Pseudomonas  - Can change to Zosyn empirically while awaiting sensitivities  - Followup culture sensitivity   - Continue with penrose drain

## 2019-12-29 NOTE — PROGRESS NOTE ADULT - SUBJECTIVE AND OBJECTIVE BOX
Patient is a 69y old  Male who presents with a chief complaint of Fever, cough, hiccups (29 Dec 2019 08:37)      SUBJECTIVE / OVERNIGHT EVENTS:  no acute events overnight, vss, afebrile  pt complains of dry cough in AM with sour taste  pt denies pain at the surgical site, wondering when the drain will come out    ROS:  14 point ROS negative in detail except stated as above    MEDICATIONS  (STANDING):  enoxaparin Injectable 40 milliGRAM(s) SubCutaneous daily  pantoprazole    Tablet 40 milliGRAM(s) Oral before breakfast  piperacillin/tazobactam IVPB.. 3.375 Gram(s) IV Intermittent every 8 hours    MEDICATIONS  (PRN):  acetaminophen   Tablet .. 650 milliGRAM(s) Oral every 6 hours PRN Mild Pain (1 - 3)  ondansetron Injectable 4 milliGRAM(s) IV Push every 6 hours PRN Nausea      CAPILLARY BLOOD GLUCOSE        I&O's Summary    28 Dec 2019 07:01  -  29 Dec 2019 07:00  --------------------------------------------------------  IN: 1200 mL / OUT: 800 mL / NET: 400 mL    29 Dec 2019 07:01  -  29 Dec 2019 12:27  --------------------------------------------------------  IN: 350 mL / OUT: 0 mL / NET: 350 mL        PHYSICAL EXAM:  Vital Signs Last 24 Hrs  T(C): 36.5 (29 Dec 2019 10:31), Max: 36.7 (29 Dec 2019 05:14)  T(F): 97.7 (29 Dec 2019 10:31), Max: 98 (29 Dec 2019 05:14)  HR: 73 (29 Dec 2019 10:31) (65 - 73)  BP: 126/81 (29 Dec 2019 10:31) (111/73 - 138/86)  BP(mean): --  RR: 18 (29 Dec 2019 10:31) (16 - 18)  SpO2: 95% (29 Dec 2019 10:31) (95% - 97%)    GENERAL: NAD, well-developed  HEAD:  Atraumatic, Normocephalic  EYES: EOMI, PERRLA, conjunctiva and sclera clear  NECK: Supple, No JVD  CHEST/LUNG: Clear to auscultation bilaterally; No wheeze  HEART: Regular rate and rhythm; No murmurs, rubs, or gallops  ABDOMEN: Soft, Nontender, Nondistended; Bowel sounds present  : drain in place  EXTREMITIES:  2+ Peripheral Pulses, No clubbing, cyanosis, or edema  NEUROLOGY: AAOx3; non-focal  SKIN: No rashes or lesions    LABS:  personally reviewed                        12.7   8.10  )-----------( 268      ( 29 Dec 2019 06:55 )             38.2     12-29    139  |  104  |  16  ----------------------------<  94  4.0   |  25  |  0.86    Ca    8.5      29 Dec 2019 06:50      Culture - Other (12.28.19 @ 01:35)    Specimen Source: Skin swab of penile implant    Culture Results:   Rare Pseudomonas aeruginosa    Culture - Blood (12.25.19 @ 09:05)    Gram Stain:   Growth in aerobic bottle: Gram Variable Rods    -  Amikacin: S <=16    -  Ampicillin/Sulbactam: S <=4/2    -  Cefepime: S <=2    -  Ceftazidime: S <=1    -  Ciprofloxacin: S <=1    -  Gentamicin: S <=2    -  Imipenem: S    -  Levofloxacin: S <=2    -  Meropenem: S <=1    -  Piperacillin/Tazobactam: S    -  Tobramycin: S <=2    -  Trimethoprim/Sulfamethoxazole: S <=2/38    -  Multidrug (KPC pos) resistant organism: Nondet    -  Staphylococcus aureus: Nondet    -  Methicillin resistant Staphylococcus aureus (MRSA): Nondet    -  Coagulase negative Staphylococcus: Nondet    -  Enterococcus species: Nondet    -  Vancomycin resistant Enterococcus sp.: Nondet    -  Escherichia coli: Nondet    -  Klebsiella oxytoca: Nondet    -  Klebsiella pneumoniae: Nondet    -  Serratia marcescens: Nondet    -  Haemophilus influenzae: Nondet    -  Listeria monocytogenes: Nondet    -  Neisseria meningitidis: Nondet    -  Pseudomonas aeruginosa: Nondet    -  Acinetobacter baumanii: Nondet    -  Enterobacter cloacae complex: Nondet    -  Streptococcus sp. (Not Grp A, B or S pneumoniae): Nondet    -  Streptococcus agalactiae (Group B): Nondet    -  Streptococcus pyogenes (Group A): Nondet    -  Streptococcus pneumoniae: Nondet    -  Candida albicans: Nondet    -  Candida glabrata: Nondet    -  Candida krusei: Nondet    -  Candida parapsilosis: Nondet    -  Candida tropicalis: Nondet    Specimen Source: .Blood Blood-Peripheral    Organism: Blood Culture PCR    Organism: Acinetobacter radioresistens    Organism: Acinetobacter radioresistens    Culture Results:   Growth in aerobic bottle: Acinetobacter radioresistens  "Due to technical problems, Proteus sp. will Not be reported as part of  the BCID panel until further notice"  ***Blood Panel PCR results on this specimen are available  approximately 3 hours after the Gram stain result.***  Gram stain, PCR, and/or culture results may not always  correspond due to difference in methodologies.  ************************************************************  This PCR assay was performed using [employer].  The following targets are tested for: Enterococcus,  vancomycin resistant enterococci, Listeria monocytogenes,  coagulase negative staphylococci, S. aureus,  methicillin resistant S. aureus, Streptococcus agalactiae  (Group B), S. pneumoniae, S. pyogenes (Group A),  Acinetobacter baumannii, Enterobacter cloacae, E. coli,  Klebsiella oxytoca, K. pneumoniae, Proteus sp.,  Serratia marcescens, Haemophilus influenzae,  Neisseria meningitidis, Pseudomonas aeruginosa, Candida  albicans, C. glabrata, C krusei, C parapsilosis,  C. tropicalis and the KPC resistance gene.    Organism Identification: Blood Culture PCR  Acinetobacter radioresistens    Method Type: PCR    Method Type: ARABELLA    Method Type: KB              RADIOLOGY & ADDITIONAL TESTS:    Imaging Personally Reviewed:    Consultant(s) Notes Reviewed:  urology, ID    Care Discussed with Consultants/Other Providers: Dr. Kraus

## 2019-12-29 NOTE — PROGRESS NOTE ADULT - ASSESSMENT
70 YO male with H/O of HTN, Reported Prostate CA , recent Penile implant surgery in 6/2019 with removal of the implant in [place] in 8 /2019 admitted for retained foreign body from implant causing sepsis, s/p exploration and foreign body removal (12/27), s/p panrose drain

## 2019-12-29 NOTE — PROGRESS NOTE ADULT - PROBLEM SELECTOR PLAN 4
Pt complains of dry cough in AM, sour taste, likely 2/2 reflux  -start PPI 40mg before breakfast  -one time dose of pepcid

## 2019-12-29 NOTE — PROGRESS NOTE ADULT - PROBLEM SELECTOR PLAN 2
Gram variable rods in 1 blood culture bottle - no further positive BCx since then  -fu OR culture sensitivity  -switch to zosyn given pseudomonas   -appreciate ID recs

## 2019-12-29 NOTE — PROGRESS NOTE ADULT - PROBLEM SELECTOR PLAN 1
2/2 urological retained foreign body from penile implant that was removed 8/2019 in [place].  -s/p exploration and foreign body removal 12/27  -OR culture with pseudomonas. Switch abx to zosyn (BCx actinobacter also sensitive to zosyn)  -fu sensitivity, final culture  -fu cultures

## 2019-12-29 NOTE — PROGRESS NOTE ADULT - ATTENDING COMMENTS
Pt seen and examined  Films reviewed  agree with plan- change to zosyn, await any po sensitivities  can irrigate drain site gently today- will continue drain for now  notably, pseudomonas wound culture not source of blood culture acinetobacter
No abscess at time of OR  FB (rear extender for prosthesis) removed in OR, irrigated, drain in place with minimal outpu  agree with plan above- pt seen and examined
Susana Kraus MD  Division of Hospital Medicine  Cell: [phone]  Pager: [phone]  Office: [phone]
Tracey Huffman DO  [facility]  [phone]
Tracey Huffman DO  [facility]  [phone]
Susana Kraus MD  Division of Hospital Medicine  Cell: [phone]  Pager: [phone]  Office: [phone]

## 2019-12-29 NOTE — PROGRESS NOTE ADULT - SUBJECTIVE AND OBJECTIVE BOX
UROLOGY DAILY PROGRESS NOTE:     Subjective:    No acute events overnight. Afebrile. Patient feels well. Mild pain to surgical site.    Objective:    NAD, awake and alert  Respirations nonlabored  Abdomen soft, nontender, nondistended  SP incision TTP  Drain with thick drainage (no longer clear SS as before)    MEDICATIONS  (STANDING):  ampicillin/sulbactam  IVPB 3 Gram(s) IV Intermittent once  ampicillin/sulbactam  IVPB      ampicillin/sulbactam  IVPB 3 Gram(s) IV Intermittent every 6 hours  enoxaparin Injectable 40 milliGRAM(s) SubCutaneous daily    MEDICATIONS  (PRN):  acetaminophen   Tablet .. 650 milliGRAM(s) Oral every 6 hours PRN Mild Pain (1 - 3)  ondansetron Injectable 4 milliGRAM(s) IV Push every 6 hours PRN Nausea      Vital Signs Last 24 Hrs  T(C): 36.7 (29 Dec 2019 05:14), Max: 36.7 (29 Dec 2019 05:14)  T(F): 98 (29 Dec 2019 05:14), Max: 98 (29 Dec 2019 05:14)  HR: 69 (29 Dec 2019 05:14) (65 - 71)  BP: 127/83 (29 Dec 2019 05:14) (111/73 - 138/86)  BP(mean): --  RR: 18 (29 Dec 2019 05:14) (16 - 18)  SpO2: 95% (29 Dec 2019 05:14) (95% - 97%)    I&O's Detail    28 Dec 2019 07:01  -  29 Dec 2019 07:00  --------------------------------------------------------  IN:    Oral Fluid: 1200 mL  Total IN: 1200 mL    OUT:    Voided: 800 mL  Total OUT: 800 mL    Total NET: 400 mL          LABS:                        12.7   8.10  )-----------( 268      ( 29 Dec 2019 06:55 )             38.2     12-29    139  |  104  |  16  ----------------------------<  94  4.0   |  25  |  0.86    Ca    8.5      29 Dec 2019 06:50

## 2019-12-30 ENCOUNTER — TRANSCRIPTION ENCOUNTER (OUTPATIENT)
Age: 69
End: 2019-12-30

## 2019-12-30 VITALS
RESPIRATION RATE: 18 BRPM | HEART RATE: 76 BPM | SYSTOLIC BLOOD PRESSURE: 123 MMHG | TEMPERATURE: 98 F | DIASTOLIC BLOOD PRESSURE: 83 MMHG | OXYGEN SATURATION: 96 %

## 2019-12-30 LAB
BASOPHILS # BLD AUTO: 0.05 K/UL — SIGNIFICANT CHANGE UP (ref 0–0.2)
BASOPHILS NFR BLD AUTO: 0.8 % — SIGNIFICANT CHANGE UP (ref 0–2)
CULTURE RESULTS: SIGNIFICANT CHANGE UP
EOSINOPHIL # BLD AUTO: 0.12 K/UL — SIGNIFICANT CHANGE UP (ref 0–0.5)
EOSINOPHIL NFR BLD AUTO: 1.9 % — SIGNIFICANT CHANGE UP (ref 0–6)
HCT VFR BLD CALC: 40.3 % — SIGNIFICANT CHANGE UP (ref 39–50)
HGB BLD-MCNC: 13.1 G/DL — SIGNIFICANT CHANGE UP (ref 13–17)
IMM GRANULOCYTES NFR BLD AUTO: 1.9 % — HIGH (ref 0–1.5)
LYMPHOCYTES # BLD AUTO: 1.43 K/UL — SIGNIFICANT CHANGE UP (ref 1–3.3)
LYMPHOCYTES # BLD AUTO: 22.4 % — SIGNIFICANT CHANGE UP (ref 13–44)
MCHC RBC-ENTMCNC: 31 PG — SIGNIFICANT CHANGE UP (ref 27–34)
MCHC RBC-ENTMCNC: 32.5 GM/DL — SIGNIFICANT CHANGE UP (ref 32–36)
MCV RBC AUTO: 95.5 FL — SIGNIFICANT CHANGE UP (ref 80–100)
MONOCYTES # BLD AUTO: 0.5 K/UL — SIGNIFICANT CHANGE UP (ref 0–0.9)
MONOCYTES NFR BLD AUTO: 7.8 % — SIGNIFICANT CHANGE UP (ref 2–14)
NEUTROPHILS # BLD AUTO: 4.15 K/UL — SIGNIFICANT CHANGE UP (ref 1.8–7.4)
NEUTROPHILS NFR BLD AUTO: 65.2 % — SIGNIFICANT CHANGE UP (ref 43–77)
NRBC # BLD: 0 /100 WBCS — SIGNIFICANT CHANGE UP (ref 0–0)
PLATELET # BLD AUTO: 311 K/UL — SIGNIFICANT CHANGE UP (ref 150–400)
RBC # BLD: 4.22 M/UL — SIGNIFICANT CHANGE UP (ref 4.2–5.8)
RBC # FLD: 11.6 % — SIGNIFICANT CHANGE UP (ref 10.3–14.5)
SPECIMEN SOURCE: SIGNIFICANT CHANGE UP
SURGICAL PATHOLOGY STUDY: SIGNIFICANT CHANGE UP
WBC # BLD: 6.37 K/UL — SIGNIFICANT CHANGE UP (ref 3.8–10.5)
WBC # FLD AUTO: 6.37 K/UL — SIGNIFICANT CHANGE UP (ref 3.8–10.5)

## 2019-12-30 PROCEDURE — 96375 TX/PRO/DX INJ NEW DRUG ADDON: CPT

## 2019-12-30 PROCEDURE — 82565 ASSAY OF CREATININE: CPT

## 2019-12-30 PROCEDURE — 84484 ASSAY OF TROPONIN QUANT: CPT

## 2019-12-30 PROCEDURE — 88305 TISSUE EXAM BY PATHOLOGIST: CPT

## 2019-12-30 PROCEDURE — 76870 US EXAM SCROTUM: CPT

## 2019-12-30 PROCEDURE — 81001 URINALYSIS AUTO W/SCOPE: CPT

## 2019-12-30 PROCEDURE — 87449 NOS EACH ORGANISM AG IA: CPT

## 2019-12-30 PROCEDURE — 85014 HEMATOCRIT: CPT

## 2019-12-30 PROCEDURE — 83010 ASSAY OF HAPTOGLOBIN QUANT: CPT

## 2019-12-30 PROCEDURE — 87486 CHLMYD PNEUM DNA AMP PROBE: CPT

## 2019-12-30 PROCEDURE — 87581 M.PNEUMON DNA AMP PROBE: CPT

## 2019-12-30 PROCEDURE — 87186 SC STD MICRODIL/AGAR DIL: CPT

## 2019-12-30 PROCEDURE — 82550 ASSAY OF CK (CPK): CPT

## 2019-12-30 PROCEDURE — 93975 VASCULAR STUDY: CPT

## 2019-12-30 PROCEDURE — 71045 X-RAY EXAM CHEST 1 VIEW: CPT

## 2019-12-30 PROCEDURE — 83690 ASSAY OF LIPASE: CPT

## 2019-12-30 PROCEDURE — 87070 CULTURE OTHR SPECIMN AEROBIC: CPT

## 2019-12-30 PROCEDURE — 74177 CT ABD & PELVIS W/CONTRAST: CPT

## 2019-12-30 PROCEDURE — 87633 RESP VIRUS 12-25 TARGETS: CPT

## 2019-12-30 PROCEDURE — 87798 DETECT AGENT NOS DNA AMP: CPT

## 2019-12-30 PROCEDURE — 86803 HEPATITIS C AB TEST: CPT

## 2019-12-30 PROCEDURE — 85027 COMPLETE CBC AUTOMATED: CPT

## 2019-12-30 PROCEDURE — 80202 ASSAY OF VANCOMYCIN: CPT

## 2019-12-30 PROCEDURE — 87150 DNA/RNA AMPLIFIED PROBE: CPT

## 2019-12-30 PROCEDURE — 93005 ELECTROCARDIOGRAM TRACING: CPT

## 2019-12-30 PROCEDURE — 82330 ASSAY OF CALCIUM: CPT

## 2019-12-30 PROCEDURE — 84132 ASSAY OF SERUM POTASSIUM: CPT

## 2019-12-30 PROCEDURE — 96374 THER/PROPH/DIAG INJ IV PUSH: CPT

## 2019-12-30 PROCEDURE — 80053 COMPREHEN METABOLIC PANEL: CPT

## 2019-12-30 PROCEDURE — 83735 ASSAY OF MAGNESIUM: CPT

## 2019-12-30 PROCEDURE — 99024 POSTOP FOLLOW-UP VISIT: CPT

## 2019-12-30 PROCEDURE — 80048 BASIC METABOLIC PNL TOTAL CA: CPT

## 2019-12-30 PROCEDURE — 85384 FIBRINOGEN ACTIVITY: CPT

## 2019-12-30 PROCEDURE — 82435 ASSAY OF BLOOD CHLORIDE: CPT

## 2019-12-30 PROCEDURE — 87184 SC STD DISK METHOD PER PLATE: CPT

## 2019-12-30 PROCEDURE — 82803 BLOOD GASES ANY COMBINATION: CPT

## 2019-12-30 PROCEDURE — 99233 SBSQ HOSP IP/OBS HIGH 50: CPT

## 2019-12-30 PROCEDURE — 83605 ASSAY OF LACTIC ACID: CPT

## 2019-12-30 PROCEDURE — 84295 ASSAY OF SERUM SODIUM: CPT

## 2019-12-30 PROCEDURE — 82553 CREATINE MB FRACTION: CPT

## 2019-12-30 PROCEDURE — 82947 ASSAY GLUCOSE BLOOD QUANT: CPT

## 2019-12-30 PROCEDURE — 99232 SBSQ HOSP IP/OBS MODERATE 35: CPT

## 2019-12-30 PROCEDURE — 87040 BLOOD CULTURE FOR BACTERIA: CPT

## 2019-12-30 PROCEDURE — 85730 THROMBOPLASTIN TIME PARTIAL: CPT

## 2019-12-30 PROCEDURE — 87086 URINE CULTURE/COLONY COUNT: CPT

## 2019-12-30 PROCEDURE — 85610 PROTHROMBIN TIME: CPT

## 2019-12-30 PROCEDURE — 99285 EMERGENCY DEPT VISIT HI MDM: CPT | Mod: 25

## 2019-12-30 RX ORDER — PANTOPRAZOLE SODIUM 20 MG/1
1 TABLET, DELAYED RELEASE ORAL
Qty: 30 | Refills: 0
Start: 2019-12-30 | End: 2020-01-28

## 2019-12-30 RX ADMIN — PIPERACILLIN AND TAZOBACTAM 25 GRAM(S): 4; .5 INJECTION, POWDER, LYOPHILIZED, FOR SOLUTION INTRAVENOUS at 14:05

## 2019-12-30 RX ADMIN — PANTOPRAZOLE SODIUM 40 MILLIGRAM(S): 20 TABLET, DELAYED RELEASE ORAL at 06:51

## 2019-12-30 RX ADMIN — ENOXAPARIN SODIUM 40 MILLIGRAM(S): 100 INJECTION SUBCUTANEOUS at 14:05

## 2019-12-30 RX ADMIN — PIPERACILLIN AND TAZOBACTAM 25 GRAM(S): 4; .5 INJECTION, POWDER, LYOPHILIZED, FOR SOLUTION INTRAVENOUS at 06:51

## 2019-12-30 NOTE — PROGRESS NOTE ADULT - PROBLEM SELECTOR PLAN 3
Had penile implant placed in 6/2019 and subsequently had it removed due to infection in [place] in 8/2019.  He has been feeling well since but now fevers and on CT has retained portion.  -s/p exploration and foreign body removal 12/27  -clarify with urology regarding penrose drain removal as family states that they were told they could be discharged with drain and f/u as an outpt

## 2019-12-30 NOTE — PROGRESS NOTE ADULT - SUBJECTIVE AND OBJECTIVE BOX
Follow Up:      Interval History/ROS:     Allergies  No Known Allergies        ANTIMICROBIALS:  piperacillin/tazobactam IVPB.. 3.375 every 8 hours      OTHER MEDS:  acetaminophen   Tablet .. 650 milliGRAM(s) Oral every 6 hours PRN  enoxaparin Injectable 40 milliGRAM(s) SubCutaneous daily  ondansetron Injectable 4 milliGRAM(s) IV Push every 6 hours PRN  pantoprazole    Tablet 40 milliGRAM(s) Oral before breakfast      Vital Signs Last 24 Hrs  T(C): 36.7 (30 Dec 2019 06:48), Max: 36.9 (29 Dec 2019 21:51)  T(F): 98 (30 Dec 2019 06:48), Max: 98.4 (29 Dec 2019 21:51)  HR: 77 (30 Dec 2019 06:48) (67 - 78)  BP: 123/84 (30 Dec 2019 06:48) (118/78 - 130/86)  BP(mean): --  RR: 17 (30 Dec 2019 06:48) (17 - 18)  SpO2: 96% (30 Dec 2019 06:48) (95% - 96%)    Physical Exam:  General: NAD, non toxic  Head: atraumatic, normocephalic  Eye: normal sclera and conjunctiva  ENT: no oropharyngeal lesions, no cervical lymphadenopathy   Cardio: regular rate and rhythm   Respiratory: nonlabored on room air, clear bilaterally, no wheezing  abd: soft, bowel sounds present, no tenderness  : no CVAT, no suprapubic tenderness, no  stephenson  Musculoskeletal: no joint swelling, no edema  vascular: no phlebitis   Skin: no rash  Neurologic: no focal deficit  psych: normal affect                          13.1   6.37  )-----------( 311      ( 30 Dec 2019 07:22 )             40.3       12-30    137  |  101  |  13  ----------------------------<  95  4.2   |  25  |  0.94    Ca    9.0      30 Dec 2019 07:17      MICROBIOLOGY:  Culture - Other (collected 12-28-19 @ 01:35)  Source: Skin swab of penile implant  Final Report (12-29-19 @ 15:57):    Rare Pseudomonas aeruginosa  Organism: Pseudomonas aeruginosa (12-29-19 @ 15:57)  Organism: Pseudomonas aeruginosa (12-29-19 @ 15:57)      -  Amikacin: S <=16      -  Aztreonam: S <=4      -  Cefepime: S 4      -  Ceftazidime: S <=1      -  Ciprofloxacin: S <=1      -  Gentamicin: S 4      -  Imipenem: S 2      -  Levofloxacin: S <=2      -  Meropenem: S <=1      -  Piperacillin/Tazobactam: S <=8      -  Tobramycin: S <=2      Method Type: ARABELLA    Culture - Urine (collected 12-27-19 @ 03:17)  Source: .Urine Clean Catch (Midstream)  Final Report (12-27-19 @ 23:05):    No growth    Culture - Blood (collected 12-26-19 @ 17:38)  Source: .Blood Blood-Peripheral  Preliminary Report (12-27-19 @ 18:01):    No growth to date.    Culture - Blood (collected 12-26-19 @ 17:38)  Source: .Blood Blood-Peripheral  Preliminary Report (12-27-19 @ 18:01):    No growth to date.    Culture - Blood (collected 12-26-19 @ 10:03)  Source: .Blood Blood-Peripheral  Preliminary Report (12-27-19 @ 11:01):    No growth to date.    Culture - Blood (collected 12-26-19 @ 10:03)  Source: .Blood Blood-Peripheral  Preliminary Report (12-27-19 @ 11:01):    No growth to date.    Culture - Blood (collected 12-25-19 @ 09:05)  Source: .Blood Blood-Peripheral  Preliminary Report (12-26-19 @ 10:01):    No growth to date.    Culture - Blood (collected 12-25-19 @ 09:05)  Source: .Blood Blood-Peripheral  Gram Stain (12-26-19 @ 02:30):    Growth in aerobic bottle: Gram Variable Rods  Final Report (12-27-19 @ 18:34):    Growth in aerobic bottle: Acinetobacter radioresistens      -  Imipenem: S      -  Piperacillin/Tazobactam: S      -  Amikacin: S <=16      -  Ampicillin/Sulbactam: S <=4/2      -  Cefepime: S <=2      -  Ceftazidime: S <=1      -  Ciprofloxacin: S <=1      -  Gentamicin: S <=2      -  Levofloxacin: S <=2      -  Meropenem: S <=1      -  Tobramycin: S <=2      -  Trimethoprim/Sulfamethoxazole: S <=2/38    Rapid RVP Result: NotDetec (12-25 @ 06:30)    RADIOLOGY:  Images below reviewed personally  CT Abdomen and Pelvis w/ IV Cont (12.25.19 @ 15:05)   Minimal patchy consolidation in the lingula.  No acute intra-abdominal pathology.  Foreign body noted within the base of the penis with surrounding stranding. On image 170 of series 3, there is a small fluid collection noted but this is only partially imaged. Correlate with physical exam for abscess/cellulitis.    US Doppler Scrotum (12.26.19 @ 14:45)   Tiny fluid collection measuring 0.2 x 0.3 cm adjacent to the left corpora cavernosa at the base of the penis.   No fluid collection identified adjacent to tubular foreign body in the mid pubic symphysis area.  Bilateral small hydrocele and bilateral varicocele as described above. Follow Up:     Interval History/ROS:     Allergies  No Known Allergies        ANTIMICROBIALS:  piperacillin/tazobactam IVPB.. 3.375 every 8 hours      OTHER MEDS:  acetaminophen   Tablet .. 650 milliGRAM(s) Oral every 6 hours PRN  enoxaparin Injectable 40 milliGRAM(s) SubCutaneous daily  ondansetron Injectable 4 milliGRAM(s) IV Push every 6 hours PRN  pantoprazole    Tablet 40 milliGRAM(s) Oral before breakfast      Vital Signs Last 24 Hrs  T(C): 36.7 (30 Dec 2019 06:48), Max: 36.9 (29 Dec 2019 21:51)  T(F): 98 (30 Dec 2019 06:48), Max: 98.4 (29 Dec 2019 21:51)  HR: 77 (30 Dec 2019 06:48) (67 - 78)  BP: 123/84 (30 Dec 2019 06:48) (118/78 - 130/86)  BP(mean): --  RR: 17 (30 Dec 2019 06:48) (17 - 18)  SpO2: 96% (30 Dec 2019 06:48) (95% - 96%)    Physical Exam:  General: NAD, non toxic  Head: atraumatic, normocephalic  Eye: normal sclera and conjunctiva  ENT: no oropharyngeal lesions, no cervical lymphadenopathy   Cardio: regular rate and rhythm   Respiratory: nonlabored on room air, clear bilaterally, no wheezing  abd: soft, bowel sounds present, no tenderness  : no CVAT, no suprapubic tenderness, no  stephenson  Musculoskeletal: no joint swelling, no edema  vascular: no phlebitis   Skin: no rash  Neurologic: no focal deficit  psych: normal affect                          13.1   6.37  )-----------( 311      ( 30 Dec 2019 07:22 )             40.3       12-30    137  |  101  |  13  ----------------------------<  95  4.2   |  25  |  0.94    Ca    9.0      30 Dec 2019 07:17      MICROBIOLOGY:  Culture - Other (collected 12-28-19 @ 01:35)  Source: Skin swab of penile implant  Final Report (12-29-19 @ 15:57):    Rare Pseudomonas aeruginosa  Organism: Pseudomonas aeruginosa (12-29-19 @ 15:57)  Organism: Pseudomonas aeruginosa (12-29-19 @ 15:57)      -  Amikacin: S <=16      -  Aztreonam: S <=4      -  Cefepime: S 4      -  Ceftazidime: S <=1      -  Ciprofloxacin: S <=1      -  Gentamicin: S 4      -  Imipenem: S 2      -  Levofloxacin: S <=2      -  Meropenem: S <=1      -  Piperacillin/Tazobactam: S <=8      -  Tobramycin: S <=2      Method Type: ARABELLA    Culture - Urine (collected 12-27-19 @ 03:17)  Source: .Urine Clean Catch (Midstream)  Final Report (12-27-19 @ 23:05):    No growth    Culture - Blood (collected 12-26-19 @ 17:38)  Source: .Blood Blood-Peripheral  Preliminary Report (12-27-19 @ 18:01):    No growth to date.    Culture - Blood (collected 12-26-19 @ 17:38)  Source: .Blood Blood-Peripheral  Preliminary Report (12-27-19 @ 18:01):    No growth to date.    Culture - Blood (collected 12-26-19 @ 10:03)  Source: .Blood Blood-Peripheral  Preliminary Report (12-27-19 @ 11:01):    No growth to date.    Culture - Blood (collected 12-26-19 @ 10:03)  Source: .Blood Blood-Peripheral  Preliminary Report (12-27-19 @ 11:01):    No growth to date.    Culture - Blood (collected 12-25-19 @ 09:05)  Source: .Blood Blood-Peripheral  Preliminary Report (12-26-19 @ 10:01):    No growth to date.    Culture - Blood (collected 12-25-19 @ 09:05)  Source: .Blood Blood-Peripheral  Gram Stain (12-26-19 @ 02:30):    Growth in aerobic bottle: Gram Variable Rods  Final Report (12-27-19 @ 18:34):    Growth in aerobic bottle: Acinetobacter radioresistens      -  Imipenem: S      -  Piperacillin/Tazobactam: S      -  Amikacin: S <=16      -  Ampicillin/Sulbactam: S <=4/2      -  Cefepime: S <=2      -  Ceftazidime: S <=1      -  Ciprofloxacin: S <=1      -  Gentamicin: S <=2      -  Levofloxacin: S <=2      -  Meropenem: S <=1      -  Tobramycin: S <=2      -  Trimethoprim/Sulfamethoxazole: S <=2/38    Rapid RVP Result: NotDetec (12-25 @ 06:30)    RADIOLOGY:  Images below reviewed personally  CT Abdomen and Pelvis w/ IV Cont (12.25.19 @ 15:05)   Minimal patchy consolidation in the lingula.  No acute intra-abdominal pathology.  Foreign body noted within the base of the penis with surrounding stranding. On image 170 of series 3, there is a small fluid collection noted but this is only partially imaged. Correlate with physical exam for abscess/cellulitis.    US Doppler Scrotum (12.26.19 @ 14:45)   Tiny fluid collection measuring 0.2 x 0.3 cm adjacent to the left corpora cavernosa at the base of the penis.   No fluid collection identified adjacent to tubular foreign body in the mid pubic symphysis area.  Bilateral small hydrocele and bilateral varicocele as described above. Follow Up: Bacteremia. Penile infection    Interval History/ROS: Afebrile. Coughing hasn't improved but it's not worse either. Not short of breath. No abdominal pain or diarrhea. His penis wasn't bothering him when he came in to the hospital, just the cough.     Allergies  No Known Allergies        ANTIMICROBIALS:  piperacillin/tazobactam IVPB.. 3.375 every 8 hours      OTHER MEDS:  acetaminophen   Tablet .. 650 milliGRAM(s) Oral every 6 hours PRN  enoxaparin Injectable 40 milliGRAM(s) SubCutaneous daily  ondansetron Injectable 4 milliGRAM(s) IV Push every 6 hours PRN  pantoprazole    Tablet 40 milliGRAM(s) Oral before breakfast      Vital Signs Last 24 Hrs  T(C): 36.7 (30 Dec 2019 06:48), Max: 36.9 (29 Dec 2019 21:51)  T(F): 98 (30 Dec 2019 06:48), Max: 98.4 (29 Dec 2019 21:51)  HR: 77 (30 Dec 2019 06:48) (67 - 78)  BP: 123/84 (30 Dec 2019 06:48) (118/78 - 130/86)  BP(mean): --  RR: 17 (30 Dec 2019 06:48) (17 - 18)  SpO2: 96% (30 Dec 2019 06:48) (95% - 96%)    Physical Exam:  General: NAD, non toxic  Head: atraumatic, normocephalic  Eye: normal sclera and conjunctiva  Cardio: regular rate and rhythm   Respiratory: nonlabored on room air, clear bilaterally, no wheezing  abd: soft, bowel sounds present, no tenderness  : suprapubic drainage site with a penrose drain, purulent secretions, nontender, indurated area palpable beneath. genital exam unremarkable   Musculoskeletal: no joint swelling, no edema  Skin: no rash  Neurologic: no focal deficit  psych: normal affect                          13.1   6.37  )-----------( 311      ( 30 Dec 2019 07:22 )             40.3       12-30    137  |  101  |  13  ----------------------------<  95  4.2   |  25  |  0.94    Ca    9.0      30 Dec 2019 07:17      MICROBIOLOGY:  Culture - Other (collected 12-28-19 @ 01:35)  Source: Skin swab of penile implant  Final Report (12-29-19 @ 15:57):    Rare Pseudomonas aeruginosa  Organism: Pseudomonas aeruginosa (12-29-19 @ 15:57)  Organism: Pseudomonas aeruginosa (12-29-19 @ 15:57)      -  Amikacin: S <=16      -  Aztreonam: S <=4      -  Cefepime: S 4      -  Ceftazidime: S <=1      -  Ciprofloxacin: S <=1      -  Gentamicin: S 4      -  Imipenem: S 2      -  Levofloxacin: S <=2      -  Meropenem: S <=1      -  Piperacillin/Tazobactam: S <=8      -  Tobramycin: S <=2      Method Type: ARABELLA    Culture - Urine (collected 12-27-19 @ 03:17)  Source: .Urine Clean Catch (Midstream)  Final Report (12-27-19 @ 23:05):    No growth    Culture - Blood (collected 12-26-19 @ 17:38)  Source: .Blood Blood-Peripheral  Preliminary Report (12-27-19 @ 18:01):    No growth to date.    Culture - Blood (collected 12-26-19 @ 17:38)  Source: .Blood Blood-Peripheral  Preliminary Report (12-27-19 @ 18:01):    No growth to date.    Culture - Blood (collected 12-26-19 @ 10:03)  Source: .Blood Blood-Peripheral  Preliminary Report (12-27-19 @ 11:01):    No growth to date.    Culture - Blood (collected 12-26-19 @ 10:03)  Source: .Blood Blood-Peripheral  Preliminary Report (12-27-19 @ 11:01):    No growth to date.    Culture - Blood (collected 12-25-19 @ 09:05)  Source: .Blood Blood-Peripheral  Preliminary Report (12-26-19 @ 10:01):    No growth to date.    Culture - Blood (collected 12-25-19 @ 09:05)  Source: .Blood Blood-Peripheral  Gram Stain (12-26-19 @ 02:30):    Growth in aerobic bottle: Gram Variable Rods  Final Report (12-27-19 @ 18:34):    Growth in aerobic bottle: Acinetobacter radioresistens      -  Imipenem: S      -  Piperacillin/Tazobactam: S      -  Amikacin: S <=16      -  Ampicillin/Sulbactam: S <=4/2      -  Cefepime: S <=2      -  Ceftazidime: S <=1      -  Ciprofloxacin: S <=1      -  Gentamicin: S <=2      -  Levofloxacin: S <=2      -  Meropenem: S <=1      -  Tobramycin: S <=2      -  Trimethoprim/Sulfamethoxazole: S <=2/38    Rapid RVP Result: NotDetec (12-25 @ 06:30)    RADIOLOGY:  Images below reviewed personally  CT Abdomen and Pelvis w/ IV Cont (12.25.19 @ 15:05)   Minimal patchy consolidation in the lingula.  No acute intra-abdominal pathology.  Foreign body noted within the base of the penis with surrounding stranding. On image 170 of series 3, there is a small fluid collection noted but this is only partially imaged. Correlate with physical exam for abscess/cellulitis.    US Doppler Scrotum (12.26.19 @ 14:45)   Tiny fluid collection measuring 0.2 x 0.3 cm adjacent to the left corpora cavernosa at the base of the penis.   No fluid collection identified adjacent to tubular foreign body in the mid pubic symphysis area.  Bilateral small hydrocele and bilateral varicocele as described above. Follow Up: Bacteremia. Penile infection    Interval History/ROS: Afebrile. Coughing hasn't improved but it's not worse either. Not short of breath. No abdominal pain or diarrhea. His penis wasn't bothering him when he came in to the hospital, just the cough.     Allergies  No Known Allergies    ANTIMICROBIALS:  piperacillin/tazobactam IVPB.. 3.375 every 8 hours      OTHER MEDS:  acetaminophen   Tablet .. 650 milliGRAM(s) Oral every 6 hours PRN  enoxaparin Injectable 40 milliGRAM(s) SubCutaneous daily  ondansetron Injectable 4 milliGRAM(s) IV Push every 6 hours PRN  pantoprazole    Tablet 40 milliGRAM(s) Oral before breakfast      Vital Signs Last 24 Hrs  T(C): 36.7 (30 Dec 2019 06:48), Max: 36.9 (29 Dec 2019 21:51)  T(F): 98 (30 Dec 2019 06:48), Max: 98.4 (29 Dec 2019 21:51)  HR: 77 (30 Dec 2019 06:48) (67 - 78)  BP: 123/84 (30 Dec 2019 06:48) (118/78 - 130/86)  BP(mean): --  RR: 17 (30 Dec 2019 06:48) (17 - 18)  SpO2: 96% (30 Dec 2019 06:48) (95% - 96%)    Physical Exam:  General: NAD, non toxic  Head: atraumatic, normocephalic  Eye: normal sclera and conjunctiva  Cardio: regular rate and rhythm   Respiratory: nonlabored on room air, clear bilaterally, no wheezing  abd: soft, bowel sounds present, no tenderness  : suprapubic drainage site with a penrose drain, purulent secretions, nontender, indurated area palpable beneath. genital exam unremarkable   Musculoskeletal: no joint swelling, no edema  Skin: no rash  Neurologic: no focal deficit  psych: normal affect                          13.1   6.37  )-----------( 311      ( 30 Dec 2019 07:22 )             40.3       12-30    137  |  101  |  13  ----------------------------<  95  4.2   |  25  |  0.94    Ca    9.0      30 Dec 2019 07:17      MICROBIOLOGY:  Culture - Other (collected 12-28-19 @ 01:35)  Source: Skin swab of penile implant  Final Report (12-29-19 @ 15:57):    Rare Pseudomonas aeruginosa  Organism: Pseudomonas aeruginosa (12-29-19 @ 15:57)  Organism: Pseudomonas aeruginosa (12-29-19 @ 15:57)      -  Amikacin: S <=16      -  Aztreonam: S <=4      -  Cefepime: S 4      -  Ceftazidime: S <=1      -  Ciprofloxacin: S <=1      -  Gentamicin: S 4      -  Imipenem: S 2      -  Levofloxacin: S <=2      -  Meropenem: S <=1      -  Piperacillin/Tazobactam: S <=8      -  Tobramycin: S <=2      Method Type: ARABELLA    Culture - Urine (collected 12-27-19 @ 03:17)  Source: .Urine Clean Catch (Midstream)  Final Report (12-27-19 @ 23:05):    No growth    Culture - Blood (collected 12-26-19 @ 17:38)  Source: .Blood Blood-Peripheral  Preliminary Report (12-27-19 @ 18:01):    No growth to date.    Culture - Blood (collected 12-26-19 @ 17:38)  Source: .Blood Blood-Peripheral  Preliminary Report (12-27-19 @ 18:01):    No growth to date.    Culture - Blood (collected 12-26-19 @ 10:03)  Source: .Blood Blood-Peripheral  Preliminary Report (12-27-19 @ 11:01):    No growth to date.    Culture - Blood (collected 12-26-19 @ 10:03)  Source: .Blood Blood-Peripheral  Preliminary Report (12-27-19 @ 11:01):    No growth to date.    Culture - Blood (collected 12-25-19 @ 09:05)  Source: .Blood Blood-Peripheral  Preliminary Report (12-26-19 @ 10:01):    No growth to date.    Culture - Blood (collected 12-25-19 @ 09:05)  Source: .Blood Blood-Peripheral  Gram Stain (12-26-19 @ 02:30):    Growth in aerobic bottle: Gram Variable Rods  Final Report (12-27-19 @ 18:34):    Growth in aerobic bottle: Acinetobacter radioresistens      -  Imipenem: S      -  Piperacillin/Tazobactam: S      -  Amikacin: S <=16      -  Ampicillin/Sulbactam: S <=4/2      -  Cefepime: S <=2      -  Ceftazidime: S <=1      -  Ciprofloxacin: S <=1      -  Gentamicin: S <=2      -  Levofloxacin: S <=2      -  Meropenem: S <=1      -  Tobramycin: S <=2      -  Trimethoprim/Sulfamethoxazole: S <=2/38    Rapid RVP Result: NotDetec (12-25 @ 06:30)    RADIOLOGY:  Images below reviewed personally  CT Abdomen and Pelvis w/ IV Cont (12.25.19 @ 15:05)   Minimal patchy consolidation in the lingula.  No acute intra-abdominal pathology.  Foreign body noted within the base of the penis with surrounding stranding. On image 170 of series 3, there is a small fluid collection noted but this is only partially imaged. Correlate with physical exam for abscess/cellulitis.    US Doppler Scrotum (12.26.19 @ 14:45)   Tiny fluid collection measuring 0.2 x 0.3 cm adjacent to the left corpora cavernosa at the base of the penis.   No fluid collection identified adjacent to tubular foreign body in the mid pubic symphysis area.  Bilateral small hydrocele and bilateral varicocele as described above.

## 2019-12-30 NOTE — PROGRESS NOTE ADULT - PROBLEM SELECTOR PLAN 2
Gram variable rods in 1 blood culture bottle - no further positive BCx since then  -fu OR culture sensitivity  -currently on zosyn, f/u ID recs

## 2019-12-30 NOTE — DISCHARGE NOTE NURSING/CASE MANAGEMENT/SOCIAL WORK - PATIENT PORTAL LINK FT
You can access the FollowMyHealth Patient Portal offered by [facility] by registering at the following website: [email or web address]. By joining [facility]’s FollowMyHealth portal, you will also be able to view your health information using other applications (apps) compatible with our system.

## 2019-12-30 NOTE — PROGRESS NOTE ADULT - SUBJECTIVE AND OBJECTIVE BOX
Patient is a 69y old  Male who presents with a chief complaint of Fever, cough, hiccups (30 Dec 2019 09:29)    SUBJECTIVE / OVERNIGHT EVENTS: no acute events overnight, pt reports that he is feeling well.     MEDICATIONS  (STANDING):  enoxaparin Injectable 40 milliGRAM(s) SubCutaneous daily  pantoprazole    Tablet 40 milliGRAM(s) Oral before breakfast  piperacillin/tazobactam IVPB.. 3.375 Gram(s) IV Intermittent every 8 hours    MEDICATIONS  (PRN):  acetaminophen   Tablet .. 650 milliGRAM(s) Oral every 6 hours PRN Mild Pain (1 - 3)  ondansetron Injectable 4 milliGRAM(s) IV Push every 6 hours PRN Nausea      Vital Signs Last 24 Hrs  T(C): 36.6 (30 Dec 2019 10:50), Max: 36.9 (29 Dec 2019 21:51)  T(F): 97.8 (30 Dec 2019 10:50), Max: 98.4 (29 Dec 2019 21:51)  HR: 78 (30 Dec 2019 10:50) (67 - 78)  BP: 127/82 (30 Dec 2019 10:50) (118/78 - 130/86)  BP(mean): --  RR: 18 (30 Dec 2019 10:50) (17 - 18)  SpO2: 95% (30 Dec 2019 10:50) (95% - 96%)  CAPILLARY BLOOD GLUCOSE        I&O's Summary    29 Dec 2019 07:01  -  30 Dec 2019 07:00  --------------------------------------------------------  IN: 1610 mL / OUT: 1075 mL / NET: 535 mL    30 Dec 2019 07:01  -  30 Dec 2019 11:39  --------------------------------------------------------  IN: 0 mL / OUT: 550 mL / NET: -550 mL    PHYSICAL EXAM:  GENERAL: NAD  EYES: conjunctiva and sclera clear  NECK: Supple, No JVD  CHEST/LUNG: Clear to auscultation bilaterally; No wheeze  HEART: +S1/S2, reg   ABDOMEN: Soft, Nontender, Nondistended; Bowel sounds present  EXTREMITIES: no edema  GROIN: dressing in place, CDI  PSYCH: AAOx3      LABS:                        13.1   6.37  )-----------( 311      ( 30 Dec 2019 07:22 )             40.3     12-30    137  |  101  |  13  ----------------------------<  95  4.2   |  25  |  0.94    Ca    9.0      30 Dec 2019 07:17

## 2019-12-30 NOTE — DISCHARGE NOTE PROVIDER - HOSPITAL COURSE
68 YO male with H/O of HTN, Reported Prostate CA , recent Penile implant surgery in 6/2019 with removal of the implant in [place] in 8 /2019 admitted for retained foreign body from implant causing sepsis, s/p exploration and foreign body removal (12/27), s/p penrose drain 68 YO male with H/O of HTN, Reported Prostate CA , recent Penile implant surgery in 6/2019 with removal of the implant in [place] in 8 /2019 admitted for retained foreign body from implant causing sepsis, s/p exploration and foreign body removal (12/27), s/p penrose drain. Now much improved , discharge home on Levaquin 750 mg daily for 7 days and follow up with Dr Hoenig on friday 1/3 also f/u with Dr. Kraus ID in 2 weeks. 70 YO male with H/O of HTN, Reported Prostate CA , recent Penile implant surgery in 6/2019 with removal of the implant in [place] in 8 /2019 admitted for retained foreign body from implant causing sepsis, s/p exploration and foreign body removal (12/27), s/p penrose drain. Now much improved , discharge home on Levaquin 750 mg daily for 7 days and follow up with Dr Hoenig on friday 1/3 also f/u with Dr. Nayana SANTIAGO in 2 weeks. Medications reviewed      reconciliation reconciliation reviewed and revised with Dr. Victoria, pt will resume anti-hypertensive medications on the advise of his primary care provider.

## 2019-12-30 NOTE — DISCHARGE NOTE PROVIDER - NSDCCPCAREPLAN_GEN_ALL_CORE_FT
PRINCIPAL DISCHARGE DIAGNOSIS  Diagnosis: Sepsis  Assessment and Plan of Treatment: S/p removal of implant particle and placement of Penrose drain  Complete full dose of antibiotic  Follow up with Urology on Friday 1/3/20 Dr Okeefe  Follow instructions for care of Penrose drain      SECONDARY DISCHARGE DIAGNOSES  Diagnosis: GERD (gastroesophageal reflux disease)  Assessment and Plan of Treatment: Continue Protonix  Avoid food that causes symptoms    Diagnosis: Essential hypertension  Assessment and Plan of Treatment: Continue Hydralazine and Lisinopril  Follow up with your medical doctor to establish long term blood pressure treatment goals.      Diagnosis: Sepsis  Assessment and Plan of Treatment: S/p removal of implant particle and placement of Penrose drain  Complete full dose of antibiotic  Follow up with Urology on Friday 1/3/20 Dr Okeefe  Follow instructions for care of Penrose drain PRINCIPAL DISCHARGE DIAGNOSIS  Diagnosis: Sepsis  Assessment and Plan of Treatment: S/p removal of implant particle and placement of Penrose drain  Complete full dose of antibiotic  Follow up with Urology on Friday 1/3/20 Dr Okeefe  Follow RN instructions for care of Penrose drain      SECONDARY DISCHARGE DIAGNOSES  Diagnosis: GERD (gastroesophageal reflux disease)  Assessment and Plan of Treatment: Continue Protonix  Avoid food that causes symptoms    Diagnosis: Essential hypertension  Assessment and Plan of Treatment: Follow up with your Primary care doctor when to resume Blood pressure medications.  Follow up with your medical doctor to establish long term blood pressure treatment goals.      Diagnosis: Sepsis  Assessment and Plan of Treatment: S/p removal of implant particle and placement of Penrose drain  Complete full dose of antibiotic  Follow up with Urology on Friday 1/3/20 Dr Okeefe  Follow instructions for care of Penrose drain

## 2019-12-30 NOTE — DIETITIAN INITIAL EVALUATION ADULT. - PERTINENT LABORATORY DATA
Na 137, K+ 4.2, BUN 13, Cr 0.94, BG 95, Phos --, Alk Phos --, AST --, ALT --, Mg --, Ca 9.0, HbA1c --

## 2019-12-30 NOTE — DIETITIAN INITIAL EVALUATION ADULT. - PROBLEM SELECTOR PLAN 1
Patient is on Losartan/HCTz at home / he does not have the dose   Since pt has sepsis of unclear origin and BP is on the soft range/ Will hold antihypertensive and once BP stabilizes , will restart with the lowest dose of Losartan and uptitrate as needed   his RX is [facility] (  ) and no answer today   MOnitor BP  Low salt diet

## 2019-12-30 NOTE — DIETITIAN INITIAL EVALUATION ADULT. - OTHER INFO
Patient seen for routine length of stay assessment.  Patient noted to be well nourished. Eating well.  Denies any GI issues.  PTA, patient ate a large amount of fresh fruits and vegetables and denies processed foods.  Wife performs the cooking and cooks with little sodium.  Weight stable and  pounds.  Supplements with Vit C, D and B12 at home.

## 2019-12-30 NOTE — PROGRESS NOTE ADULT - ASSESSMENT
69M s/p radical retropubic prostatectomy 2011, penile implant 6/2019 which was removed 8/2019 due to infections, admitted 12/25/19 with fever 102.9F and cough.   Found to be bacteremic with Acinetobacter radioresistens.   Incidentally, CT identified a retained foreign body at the base of the penis with an associated abscess. s/p explantation 12/27, cultures growing Pseudomonas aeruginosa.       Jose Mcguire MD   Infectious Disease   Pager [phone]   After 5PM and on weekends please page fellow on call or call [phone] 69M s/p radical retropubic prostatectomy 2011, penile implant 6/2019 which was removed 8/2019 due to infections, admitted 12/25/19 with fever 102.9F and cough.   Left lingula patchy opacity   Bacteremic with Acinetobacter radioresistens   Incidental finding of a retained foreign body at the base of the penis with an associated abscess. s/p explantation 12/27, cultures growing Pseudomonas aeruginosa     If discharged Levaquin 750mg PO daily for another week   can see Dr Kraus in the office for follow up     Jose Mcguire MD   Infectious Disease   Pager [phone]   After 5PM and on weekends please page fellow on call or call [phone] 69M s/p radical retropubic prostatectomy 2011, penile implant 6/2019 which was removed 8/2019 due to infections, admitted 12/25/19 with fever 102.9F and cough.   Left lingula patchy opacity   Bacteremic with Acinetobacter radioresistens, cleared 12/26   Incidental finding on CT of a retained foreign body at the base of the penis with associated abscess. s/p explantation 12/27, cultures growing Pseudomonas aeruginosa   There has been a report of Acinetobacter radioresistens isolated in a case of pneumonia although the association was not clear. I asked micro to double check if anything else grew from the wound.   He reports his cough isn't better but he is comfortable and saturating well on room air.     Suggest  -continue Zosyn while here, at discharge can changed to Levaquin 750mg PO daily for another week   -can see Dr Kraus in the office for follow up     Spoke with primary team     Jose Mcguire MD   Infectious Disease   Pager [phone]   After 5PM and on weekends please page fellow on call or call [phone] 69M s/p radical retropubic prostatectomy 2011, penile implant 6/2019 which was removed 8/2019 due to infections, admitted 12/25/19 with fever 102.9F and cough.   Left lingula patchy opacity   Bacteremic with Acinetobacter radioresistens, cleared 12/26   Incidental finding on CT of a retained foreign body at the base of the penis with associated abscess. s/p explantation and penrose drain 12/27, cultures growing Pseudomonas aeruginosa   There has been a report of Acinetobacter radioresistens isolated in a case of pneumonia although the association was not clear. I asked micro to double check if anything else grew from the wound.   He reports his cough isn't better but he is comfortable and saturating well on room air.     Suggest  -continue Zosyn while here, at discharge can changed to Levaquin 750mg PO daily for another week   -can see Dr Kraus in the office for follow up     Spoke with primary team     Jose Mcguire MD   Infectious Disease   Pager [phone]   After 5PM and on weekends please page fellow on call or call [phone]

## 2019-12-30 NOTE — PROGRESS NOTE ADULT - SUBJECTIVE AND OBJECTIVE BOX
Subjective    Seen & examined at bedside  No acute events overnight  Remains afebrile    Objective    Vital signs  T(F): , Max: 98.4 (12-29-19 @ 21:51)  HR: 77 (12-30-19 @ 06:48)  BP: 123/84 (12-30-19 @ 06:48)  SpO2: 96% (12-30-19 @ 06:48)  Wt(kg): --    Output     12-29 @ 07:01  -  12-30 @ 07:00  --------------------------------------------------------  IN: 1610 mL / OUT: 1075 mL / NET: 535 mL    Physical Exam  Gen NAD  Abd soft, NT, ND   suprapubic site with some serous drainage, penrose in place.  4x4 dressing in place.    Labs  12-30 @ 07:22  WBC 6.37  / Hct 40.3  / SCr --       12-30 @ 07:17  WBC --    / Hct --    / SCr 0.94     Wound Culture - Other (12.28.19 @ 01:35)    -  Amikacin: S <=16    -  Aztreonam: S <=4    -  Cefepime: S 4    -  Ceftazidime: S <=1    -  Ciprofloxacin: S <=1    -  Gentamicin: S 4    -  Imipenem: S 2    -  Levofloxacin: S <=2    -  Meropenem: S <=1    -  Piperacillin/Tazobactam: S <=8    -  Tobramycin: S <=2    Specimen Source: Skin swab of penile implant    Culture Results:   Rare Pseudomonas aeruginosa    Organism Identification: Pseudomonas aeruginosa    Organism: Pseudomonas aeruginosa    Method Type: ARABELLA    Blood Cx: Culture - Blood (12.25.19 @ 09:05)    -  Trimethoprim/Sulfamethoxazole: S <=2/38    -  Tobramycin: S <=2    -  Piperacillin/Tazobactam: S    -  Meropenem: S <=1    -  Levofloxacin: S <=2    -  Imipenem: S    -  Gentamicin: S <=2    -  Multidrug (KPC pos) resistant organism: Nondet    -  Staphylococcus aureus: Nondet    -  Methicillin resistant Staphylococcus aureus (MRSA): Nondet    -  Coagulase negative Staphylococcus: Nondet    -  Enterococcus species: Nondet    -  Vancomycin resistant Enterococcus sp.: Nondet    -  Escherichia coli: Nondet    -  Klebsiella oxytoca: Nondet    -  Klebsiella pneumoniae: Nondet    -  Serratia marcescens: Nondet    -  Haemophilus influenzae: Nondet    -  Listeria monocytogenes: Nondet    -  Neisseria meningitidis: Nondet    -  Pseudomonas aeruginosa: Nondet    -  Acinetobacter baumanii: Nondet    -  Enterobacter cloacae complex: Nondet    -  Streptococcus sp. (Not Grp A, B or S pneumoniae): Nondet    -  Streptococcus agalactiae (Group B): Nondet    -  Streptococcus pyogenes (Group A): Nondet    -  Streptococcus pneumoniae: Nondet    -  Candida albicans: Nondet    -  Candida glabrata: Nondet    -  Candida krusei: Nondet    -  Candida parapsilosis: Nondet    -  Candida tropicalis: Nondet    Gram Stain:   Growth in aerobic bottle: Gram Variable Rods    -  Amikacin: S <=16    -  Ciprofloxacin: S <=1    -  Ceftazidime: S <=1    -  Cefepime: S <=2    -  Ampicillin/Sulbactam: S <=4/2    Specimen Source: .Blood Blood-Peripheral    Organism: Blood Culture PCR    Organism: Acinetobacter radioresistens    Organism: Acinetobacter radioresistens    Culture Results:   Growth in aerobic bottle: Acinetobacter radioresistens  "Due to technical problems, Proteus sp. will Not be reported as part of  the BCID panel until further notice"  ***Blood Panel PCR results on this specimen are available  approximately 3 hours after the Gram stain result.***  Gram stain, PCR, and/or culture results may not always  correspond due to difference in methodologies.  ************************************************************  This PCR assay was performed using [employer].  The following targets are tested for: Enterococcus,  vancomycin resistant enterococci, Listeria monocytogenes,  coagulase negative staphylococci, S. aureus,  methicillin resistant S. aureus, Streptococcus agalactiae  (Group B), S. pneumoniae, S. pyogenes (Group A),  Acinetobacter baumannii, Enterobacter cloacae, E. coli,  Klebsiella oxytoca, K. pneumoniae, Proteus sp.,  Serratia marcescens, Haemophilus influenzae,  Neisseria meningitidis, Pseudomonas aeruginosa, Candida  albicans, C. glabrata, C krusei, C parapsilosis,  C. tropicalis and the KPC resistance gene.    Organism Identification: Blood Culture PCR  Acinetobacter radioresistens    Method Type: PCR    Method Type: ARABELLA    Method Type: KB    Imaging    < from: US Testicles (12.26.19 @ 14:45) >  RIGHT:    Right testis: 3.9 x 1.8 x 2.7 cm. Normal echogenicity and echotexture with no masses or areas of architectural distortion. Normal arterial and venous blood flow pattern.    Right epididymis: Within normal limits. 1.1 x 0.8 x 0.9 cm.    Right hydrocele: Small.    Right varicocele: Small, measuring 2.6 mm and 3.4 mm with Valsalva maneuver.      LEFT:     Left testis: 3.7 x 1.7 x 2.6 cm. Normal echogenicity and echotexture with no masses or areas of architectural distortion. Normal arterial and venous blood flow pattern.    Left epididymis: Within normal limits. 1.0 x 0.9 x 0.8 cm.    Left hydrocele: Small.    Left varicocele: Small, measuring 2.7 mm and  3.4 m with a Valsalva maneuver..    There is a small fluid collection measuring 0.2 x 0.3 cm adjacent to the lelf corpora cavernosa at the base of the penis.    Redemonstrated tubular foreign body in the mid pubic symphysis area. No adjacent fluid collection is identified.    IMPRESSION:     Tiny fluid collection measuring 0.2 x 0.3 cm adjacent to the left corpora cavernosa at the base of the penis.     No fluid collection identified adjacent to tubular foreign body in the mid pubic symphysis area.    Bilateral small hydrocele and bilateral varicocele as described above.    < end of copied text > Subjective    Seen & examined at bedside  No acute events overnight  Remains afebrile    Objective    Vital signs  T(F): , Max: 98.4 (12-29-19 @ 21:51)  HR: 77 (12-30-19 @ 06:48)  BP: 123/84 (12-30-19 @ 06:48)  SpO2: 96% (12-30-19 @ 06:48)  Wt(kg): --    Output     12-29 @ 07:01  -  12-30 @ 07:00  --------------------------------------------------------  IN: 1610 mL / OUT: 1075 mL / NET: 535 mL    Physical Exam  Gen NAD  Abd soft, NT, ND   suprapubic site with some seropurulent drainage, penrose in place.  4x4 dressing in place.    Labs  12-30 @ 07:22  WBC 6.37  / Hct 40.3  / SCr --       12-30 @ 07:17  WBC --    / Hct --    / SCr 0.94     Wound Culture - Other (12.28.19 @ 01:35)    -  Amikacin: S <=16    -  Aztreonam: S <=4    -  Cefepime: S 4    -  Ceftazidime: S <=1    -  Ciprofloxacin: S <=1    -  Gentamicin: S 4    -  Imipenem: S 2    -  Levofloxacin: S <=2    -  Meropenem: S <=1    -  Piperacillin/Tazobactam: S <=8    -  Tobramycin: S <=2    Specimen Source: Skin swab of penile implant    Culture Results:   Rare Pseudomonas aeruginosa    Organism Identification: Pseudomonas aeruginosa    Organism: Pseudomonas aeruginosa    Method Type: ARABELLA    Blood Cx: Culture - Blood (12.25.19 @ 09:05)    -  Trimethoprim/Sulfamethoxazole: S <=2/38    -  Tobramycin: S <=2    -  Piperacillin/Tazobactam: S    -  Meropenem: S <=1    -  Levofloxacin: S <=2    -  Imipenem: S    -  Gentamicin: S <=2    -  Multidrug (KPC pos) resistant organism: Nondet    -  Staphylococcus aureus: Nondet    -  Methicillin resistant Staphylococcus aureus (MRSA): Nondet    -  Coagulase negative Staphylococcus: Nondet    -  Enterococcus species: Nondet    -  Vancomycin resistant Enterococcus sp.: Nondet    -  Escherichia coli: Nondet    -  Klebsiella oxytoca: Nondet    -  Klebsiella pneumoniae: Nondet    -  Serratia marcescens: Nondet    -  Haemophilus influenzae: Nondet    -  Listeria monocytogenes: Nondet    -  Neisseria meningitidis: Nondet    -  Pseudomonas aeruginosa: Nondet    -  Acinetobacter baumanii: Nondet    -  Enterobacter cloacae complex: Nondet    -  Streptococcus sp. (Not Grp A, B or S pneumoniae): Nondet    -  Streptococcus agalactiae (Group B): Nondet    -  Streptococcus pyogenes (Group A): Nondet    -  Streptococcus pneumoniae: Nondet    -  Candida albicans: Nondet    -  Candida glabrata: Nondet    -  Candida krusei: Nondet    -  Candida parapsilosis: Nondet    -  Candida tropicalis: Nondet    Gram Stain:   Growth in aerobic bottle: Gram Variable Rods    -  Amikacin: S <=16    -  Ciprofloxacin: S <=1    -  Ceftazidime: S <=1    -  Cefepime: S <=2    -  Ampicillin/Sulbactam: S <=4/2    Specimen Source: .Blood Blood-Peripheral    Organism: Blood Culture PCR    Organism: Acinetobacter radioresistens    Organism: Acinetobacter radioresistens    Culture Results:   Growth in aerobic bottle: Acinetobacter radioresistens  "Due to technical problems, Proteus sp. will Not be reported as part of  the BCID panel until further notice"  ***Blood Panel PCR results on this specimen are available  approximately 3 hours after the Gram stain result.***  Gram stain, PCR, and/or culture results may not always  correspond due to difference in methodologies.  ************************************************************  This PCR assay was performed using [employer].  The following targets are tested for: Enterococcus,  vancomycin resistant enterococci, Listeria monocytogenes,  coagulase negative staphylococci, S. aureus,  methicillin resistant S. aureus, Streptococcus agalactiae  (Group B), S. pneumoniae, S. pyogenes (Group A),  Acinetobacter baumannii, Enterobacter cloacae, E. coli,  Klebsiella oxytoca, K. pneumoniae, Proteus sp.,  Serratia marcescens, Haemophilus influenzae,  Neisseria meningitidis, Pseudomonas aeruginosa, Candida  albicans, C. glabrata, C krusei, C parapsilosis,  C. tropicalis and the KPC resistance gene.    Organism Identification: Blood Culture PCR  Acinetobacter radioresistens    Method Type: PCR    Method Type: ARABELLA    Method Type: KB    Imaging    < from: US Testicles (12.26.19 @ 14:45) >  RIGHT:    Right testis: 3.9 x 1.8 x 2.7 cm. Normal echogenicity and echotexture with no masses or areas of architectural distortion. Normal arterial and venous blood flow pattern.    Right epididymis: Within normal limits. 1.1 x 0.8 x 0.9 cm.    Right hydrocele: Small.    Right varicocele: Small, measuring 2.6 mm and 3.4 mm with Valsalva maneuver.      LEFT:     Left testis: 3.7 x 1.7 x 2.6 cm. Normal echogenicity and echotexture with no masses or areas of architectural distortion. Normal arterial and venous blood flow pattern.    Left epididymis: Within normal limits. 1.0 x 0.9 x 0.8 cm.    Left hydrocele: Small.    Left varicocele: Small, measuring 2.7 mm and  3.4 m with a Valsalva maneuver..    There is a small fluid collection measuring 0.2 x 0.3 cm adjacent to the lelf corpora cavernosa at the base of the penis.    Redemonstrated tubular foreign body in the mid pubic symphysis area. No adjacent fluid collection is identified.    IMPRESSION:     Tiny fluid collection measuring 0.2 x 0.3 cm adjacent to the left corpora cavernosa at the base of the penis.     No fluid collection identified adjacent to tubular foreign body in the mid pubic symphysis area.    Bilateral small hydrocele and bilateral varicocele as described above.    < end of copied text >

## 2019-12-30 NOTE — DIETITIAN INITIAL EVALUATION ADULT. - ENERGY NEEDS
HT 68 inches,  pounds, BMI25.1,  pounds.  Dxd with Sepsis, retained foreign body from penile implant, bacteremia.  Skin intact.  Well nourished

## 2019-12-30 NOTE — DISCHARGE NOTE PROVIDER - CARE PROVIDER_API CALL
Hoenig, David M (MD)  Urology  [facility], [address] [place] [address]  Phone: [phone]  Fax: [phone]  Follow Up Time: 1-3 days Hoenig, David M (MD)  Urology  [place] [facility], [address] [place] [address]  Phone: [phone]  Fax: [phone]  Follow Up Time: 1 week    Marilynn Kraus)  Infectious Disease; Internal Medicine  [address] [place] [id number]  Phone: [phone]  Fax: [phone]  Follow Up Time: 2 weeks

## 2019-12-30 NOTE — PROGRESS NOTE ADULT - REASON FOR ADMISSION
Fever, cough, hiccups

## 2019-12-30 NOTE — DIETITIAN INITIAL EVALUATION ADULT. - ADD RECOMMEND
Dash diet, continue Vitamin supplementation, Monitor diet tolerance, po intake, GI tolerance, weight trends, labs, and skin integrity , reinforce heart healthy diet

## 2019-12-30 NOTE — DIETITIAN INITIAL EVALUATION ADULT. - PERTINENT MEDS FT
MEDICATIONS  (STANDING):  enoxaparin Injectable 40 milliGRAM(s) SubCutaneous daily  pantoprazole    Tablet 40 milliGRAM(s) Oral before breakfast  piperacillin/tazobactam IVPB.. 3.375 Gram(s) IV Intermittent every 8 hours    MEDICATIONS  (PRN):  acetaminophen   Tablet .. 650 milliGRAM(s) Oral every 6 hours PRN Mild Pain (1 - 3)  ondansetron Injectable 4 milliGRAM(s) IV Push every 6 hours PRN Nausea

## 2019-12-30 NOTE — DIETITIAN INITIAL EVALUATION ADULT. - PROBLEM SELECTOR PLAN 2
Unclear of source of any infection   S/P Zithromax and Ceftriaxone in the ED   CXR / RVP are non revealing   F/UP Blood CX   F/up repeat Lactate   INR is mildly elevated / will repeat and add fibrinogen , PLT Is reasonable with no bleed  unsure if secondary to sepsis / will repeat lab and get haptoglobin   Will get U/a as pt states that in 6/2019 , [place] and in 8/2019 , he went to [place] to have the implant removed due to report of infection   F/UP UCX   Will get CT of ABD and might get Urology involved  cont Ceftriaxone for now

## 2019-12-30 NOTE — DISCHARGE NOTE PROVIDER - PROVIDER TOKENS
PROVIDER:[TOKEN:[[id number]],FOLLOWUP:[1-3 days]] PROVIDER:[TOKEN:[[id number]],FOLLOWUP:[1 week]],PROVIDER:[TOKEN:[[id number]],FOLLOWUP:[2 weeks]]

## 2019-12-30 NOTE — PROGRESS NOTE ADULT - ASSESSMENT
69 year old male with hx of HTN, penile prosthesis 6/19 s/p explant in [place] 8/19 presenting with fevers. CT shows stranding around remaining piece of prosthesis with small collection (partially imaged), s/p exploration and foreign body removal (12/27).    Afebrile. WBC wnl.  Notably, Blood cultures with acinetobacter but wound culture from retained prosthesis shows pseudomonas so likely not the primary  of infection.    - Continue zosyn, may switch to PO abx  - Irrigate the suprapubic site with 60 cc of sterile water with terrie syringe daily  -- Will need to d/c the penrose prior to discharge 69 year old male with hx of HTN, penile prosthesis 6/19 s/p explant in [place] 8/19 presenting with fevers. CT shows stranding around remaining piece of prosthesis with small collection (partially imaged), s/p exploration and foreign body removal (12/27).    Afebrile. WBC wnl.  Notably, Blood cultures with acinetobacter but wound culture from retained prosthesis shows pseudomonas so likely not the primary  of infection.    -- Continue zosyn, may switch to PO abx  -- Irrigate the suprapubic site with 60 cc of sterile water with terrie syringe daily  -- Will need to d/c the penrose prior to discharge, please alert urology team for d/c of drain 69 year old male with hx of HTN, penile prosthesis 6/19 s/p explant in [place] 8/19 presenting with fevers. CT shows stranding around remaining piece of prosthesis with small collection (partially imaged), s/p exploration and foreign body removal (12/27).    Afebrile. WBC wnl.  Notably, Blood cultures with acinetobacter but wound culture from retained prosthesis shows pseudomonas so likely not the primary  of infection.    -- Continue zosyn, may switch to PO abx  -- Irrigate the suprapubic site with 60 cc of sterile water with terrie syringe daily  -- Will need to go home with the penrose drain in place, and to finish a course of ciprofloxacin for the pseudomonas.  Will follow up with Dr. Hoenig in 1-2 weeks for penrose drain removal in the office at the [facility] at [phone]

## 2019-12-30 NOTE — DISCHARGE NOTE PROVIDER - CARE PROVIDERS DIRECT ADDRESSES
[email or web address]

## 2019-12-30 NOTE — DISCHARGE NOTE PROVIDER - NSDCMRMEDTOKEN_GEN_ALL_CORE_FT
Advil 200 mg oral tablet: 1 tab(s) orally every 6 hours, As Needed/last dose 2/2016  hydrochlorothiazide 12.5 mg oral tablet: 1 tab(s) orally once a day/am  lisinopril 5 mg oral tablet: 1 tab(s) orally once a day/am  royal jelly:   1/2 tsp daily orally/ last dose 2/15/16  see medication reconciliation form: hydrochlorothiazide 12.5 mg oral tablet: 1 tab(s) orally once a day/am  Levaquin 750 mg oral tablet: 1 tab(s) orally once a day   lisinopril 5 mg oral tablet: 1 tab(s) orally once a day/am  pantoprazole 40 mg oral delayed release tablet: 1 tab(s) orally once a day (before a meal) Levaquin 750 mg oral tablet: 1 tab(s) orally once a day   pantoprazole 40 mg oral delayed release tablet: 1 tab(s) orally once a day (before a meal)

## 2019-12-30 NOTE — PROGRESS NOTE ADULT - PROBLEM SELECTOR PLAN 1
2/2 urological retained foreign body from penile implant that was removed 8/2019 in [place].  -s/p exploration and foreign body removal 12/27  -OR culture with pseudomonas. Switch abx to zosyn (BCx actinobacter also sensitive to zosyn)  -skin cultures show pseudomonas sens to levaquin, may be able to be discharged on PO pending final ID recs

## 2019-12-31 LAB
CULTURE RESULTS: SIGNIFICANT CHANGE UP
SPECIMEN SOURCE: SIGNIFICANT CHANGE UP

## 2020-01-03 ENCOUNTER — APPOINTMENT (OUTPATIENT)
Dept: UROLOGY | Facility: CLINIC | Age: 70
End: 2020-01-03
Payer: MEDICARE

## 2020-01-03 PROCEDURE — 99024 POSTOP FOLLOW-UP VISIT: CPT

## 2020-01-03 NOTE — ASSESSMENT
[FreeTextEntry1] : penrose drain removed today\par wound irrigated with 30 cc 1/2 strength peroxide, and dressed with DSD\par \par Cont cipro-- RTC 1 week for wound check

## 2020-01-03 NOTE — PHYSICAL EXAM
[General Appearance - Well Nourished] : well nourished [General Appearance - Well Developed] : well developed [Well Groomed] : well groomed [General Appearance - In No Acute Distress] : no acute distress [Normal Appearance] : normal appearance [Respiration, Rhythm And Depth] : normal respiratory rhythm and effort [] : no respiratory distress [Exaggerated Use Of Accessory Muscles For Inspiration] : no accessory muscle use [Abdomen Soft] : soft [Abdomen Tenderness] : non-tender [Costovertebral Angle Tenderness] : no ~M costovertebral angle tenderness [Normal Station and Gait] : the gait and station were normal for the patient's age [Oriented To Time, Place, And Person] : oriented to person, place, and time [Not Anxious] : not anxious [Mood] : the mood was normal [Affect] : the affect was normal [FreeTextEntry1] : infrapubic incision with small penrose, wound clean, small expressed fluid serosang

## 2020-01-03 NOTE — HISTORY OF PRESENT ILLNESS
[None] : no symptoms [FreeTextEntry1] : Pt is 68 yo male with h/o penile prosthesis placement by Dr. Harry Marshall 4/2019, with replacement 7/2019 because 'wasn't working well', and finally explant of prosthesis in [place] approx 8/13/2019 (reservoir in place) because of infection.  Presented to [facility] ~ 2 weeks ago with high fever- found to have residual rear-extender which appeared to have eroded through dorsal corpora with inflammation, but also URI.  Admitted.  Blood culture with acinetobacter.\par \par Brought to OR, and culture of device showed other bacteria, pseudomonas.  Currently on cipro, penrose in place for discharge.\par \par No current fever, feeling well.  Still with some drainage from drain site, but improved

## 2020-01-09 ENCOUNTER — APPOINTMENT (OUTPATIENT)
Dept: UROLOGY | Facility: CLINIC | Age: 70
End: 2020-01-09
Payer: MEDICARE

## 2020-01-09 PROCEDURE — 99024 POSTOP FOLLOW-UP VISIT: CPT

## 2020-01-09 NOTE — HISTORY OF PRESENT ILLNESS
[FreeTextEntry1] : Pt is 68 yo male with h/o penile prosthesis placement by Dr. Hrary Marshall 4/2019, with replacement 7/2019 because 'wasn't working well', and finally explant of prosthesis in [place] approx 8/13/2019 (reservoir in place) because of infection.  Presented to [facility] ~ 2 weeks ago with high fever- found to have residual rear-extender which appeared to have eroded through dorsal corpora with inflammation, but also URI.  Admitted.  Blood culture with acinetobacter.\par \par Brought to OR, and culture of device showed other bacteria, pseudomonas.  Currently on cipro, penrose in place for discharge.  Removed last week\par \par No current fever, feeling well.  No further drainage from drain site since penrose removed.  Some pain in base of left shaft noted.  No fever.  Finished cipro 2 days ago

## 2020-01-09 NOTE — ASSESSMENT
[FreeTextEntry1] : Doing well overall.\par \par Appears to be resolving, though pain in left base of shaft/corpora could be related to ongoing process vs. healing/inflam.  Not very tender to palpation, and no fluctuance. \par \par No further abx at this time\par will arrange f/u in ~ 4 to 6 weeks to reassess, re-evaluate pain\par would then refer to Dr. Wing or Dr. Epstein for eval, as pt is still interested in possible treatment for the ED.

## 2020-01-09 NOTE — PHYSICAL EXAM
[General Appearance - Well Developed] : well developed [General Appearance - Well Nourished] : well nourished [Normal Appearance] : normal appearance [Well Groomed] : well groomed [General Appearance - In No Acute Distress] : no acute distress [Abdomen Soft] : soft [Abdomen Tenderness] : non-tender [Costovertebral Angle Tenderness] : no ~M costovertebral angle tenderness [] : no respiratory distress [FreeTextEntry1] : firm corpora bilateral, left sl > right, no fluctuance, no expressed purulence [Respiration, Rhythm And Depth] : normal respiratory rhythm and effort [Exaggerated Use Of Accessory Muscles For Inspiration] : no accessory muscle use [Oriented To Time, Place, And Person] : oriented to person, place, and time [Affect] : the affect was normal [Mood] : the mood was normal [Not Anxious] : not anxious [Normal Station and Gait] : the gait and station were normal for the patient's age [No Focal Deficits] : no focal deficits

## 2020-01-28 ENCOUNTER — APPOINTMENT (OUTPATIENT)
Dept: UROLOGY | Facility: CLINIC | Age: 70
End: 2020-01-28

## 2020-02-03 ENCOUNTER — APPOINTMENT (OUTPATIENT)
Dept: INFECTIOUS DISEASE | Facility: CLINIC | Age: 70
End: 2020-02-03
Payer: MEDICARE

## 2020-02-03 VITALS
HEART RATE: 76 BPM | HEIGHT: 68 IN | DIASTOLIC BLOOD PRESSURE: 74 MMHG | TEMPERATURE: 97.5 F | WEIGHT: 160 LBS | SYSTOLIC BLOOD PRESSURE: 119 MMHG | BODY MASS INDEX: 24.25 KG/M2 | OXYGEN SATURATION: 97 % | RESPIRATION RATE: 18 BRPM

## 2020-02-03 DIAGNOSIS — R89.5 ABNORMAL MICROBIOLOGICAL FINDINGS IN SPECIMENS FROM OTHER ORGANS, SYSTEMS AND TISSUES: ICD-10-CM

## 2020-02-03 PROCEDURE — 99213 OFFICE O/P EST LOW 20 MIN: CPT

## 2020-02-07 ENCOUNTER — APPOINTMENT (OUTPATIENT)
Dept: UROLOGY | Facility: CLINIC | Age: 70
End: 2020-02-07
Payer: MEDICARE

## 2020-02-07 DIAGNOSIS — A49.8 OTHER BACTERIAL INFECTIONS OF UNSPECIFIED SITE: ICD-10-CM

## 2020-02-07 PROCEDURE — 99213 OFFICE O/P EST LOW 20 MIN: CPT | Mod: 24

## 2020-02-07 NOTE — HISTORY OF PRESENT ILLNESS
[FreeTextEntry1] : Pt returns- feeling well. Wounds healed well.  Some discomfort ventral penile shaft, but no abdominal or SP area tenderness, no sig base of shaft penile tenderness, s/p removal of infected rear-tip extender and placement SP penrose.\par \par Still considering treatment for ED concerns.\par \par No abdominal or flank discomfort.

## 2020-02-07 NOTE — ASSESSMENT
[FreeTextEntry1] : Doing well overall.\par \par Has reservoir left extraperitoneal, did not appear involved at time of rear tip extender removal based on CT scan.  No pain currently.  At risk for infection.\par \par Will have pt get BMP, PSA, urine culture today\par will refer to Dr. Wing for eval, best recommendations moving forward

## 2020-02-07 NOTE — PHYSICAL EXAM
[General Appearance - Well Developed] : well developed [General Appearance - Well Nourished] : well nourished [Normal Appearance] : normal appearance [Well Groomed] : well groomed [General Appearance - In No Acute Distress] : no acute distress [Abdomen Tenderness] : non-tender [Abdomen Soft] : soft [Costovertebral Angle Tenderness] : no ~M costovertebral angle tenderness [Testes Mass (___cm)] : there were no testicular masses [Testes Tenderness] : no tenderness of the testes [] : no respiratory distress [Respiration, Rhythm And Depth] : normal respiratory rhythm and effort [Exaggerated Use Of Accessory Muscles For Inspiration] : no accessory muscle use [Oriented To Time, Place, And Person] : oriented to person, place, and time [Affect] : the affect was normal [Mood] : the mood was normal [Not Anxious] : not anxious [Normal Station and Gait] : the gait and station were normal for the patient's age [No Focal Deficits] : no focal deficits [FreeTextEntry1] : induration left base of penile shaft corpus cavernosum

## 2020-02-08 LAB
ANION GAP SERPL CALC-SCNC: 14 MMOL/L
BACTERIA UR CULT: NORMAL
BUN SERPL-MCNC: 19 MG/DL
CALCIUM SERPL-MCNC: 9.8 MG/DL
CHLORIDE SERPL-SCNC: 99 MMOL/L
CO2 SERPL-SCNC: 29 MMOL/L
CREAT SERPL-MCNC: 0.93 MG/DL
GLUCOSE SERPL-MCNC: 121 MG/DL
POTASSIUM SERPL-SCNC: 4.2 MMOL/L
PSA SERPL-MCNC: 0.31 NG/ML
SODIUM SERPL-SCNC: 142 MMOL/L

## 2020-02-25 ENCOUNTER — APPOINTMENT (OUTPATIENT)
Dept: UROLOGY | Facility: CLINIC | Age: 70
End: 2020-02-25

## 2020-03-10 NOTE — PRE-ANESTHESIA EVALUATION ADULT - HEART RATE (BEATS/MIN)
CONCERNS: Mother states Miguelina has been extremely whiny and has trouble sleeping.  Also has been pulling at ears frequently.  Pt. Presents today with cough and runny nose.  Child accompanied by mother and brother  Mothers work status: full time  Child with:   DIET:      Milk - whole, 2 percent, chocolate       Frequency - 8 ounces / day      Appetite - good  STOOLS: 2 / day  SLEEP:      Naps - 2, 3 hr / day      Night - 10 hr  IMMUNIZATION REACTIONS: NO  VARICELLA STATUS: confirmed by vaccine administration  GROWTH & DEVELOPMENT      Imitates housework - YES      [place] of 2 cubes - YES      3 words - YES      Walks well - YES    MCHAT given: yes    Patient's mother would like communication of their results via:      Cell Phone:   Telephone Information:   Mobile [phone]     Okay to leave a message containing results? Yes  Health Maintenance Due   Topic Date Due   • Hepatitis A Vaccine (2 of 2 - 2-dose series) 12/15/2019       Patient is due for the topics as listed above and wishes to proceed with them.             
62
68

## 2020-03-17 ENCOUNTER — APPOINTMENT (OUTPATIENT)
Dept: UROLOGY | Facility: CLINIC | Age: 70
End: 2020-03-17

## 2020-08-19 ENCOUNTER — APPOINTMENT (OUTPATIENT)
Dept: CARDIOLOGY | Facility: CLINIC | Age: 70
End: 2020-08-19
Payer: MEDICARE

## 2020-08-19 VITALS
SYSTOLIC BLOOD PRESSURE: 116 MMHG | WEIGHT: 161 LBS | RESPIRATION RATE: 12 BRPM | DIASTOLIC BLOOD PRESSURE: 82 MMHG | HEART RATE: 78 BPM | BODY MASS INDEX: 24.48 KG/M2

## 2020-08-19 PROCEDURE — 99214 OFFICE O/P EST MOD 30 MIN: CPT

## 2020-08-19 PROCEDURE — 93000 ELECTROCARDIOGRAM COMPLETE: CPT

## 2020-08-19 NOTE — PHYSICAL EXAM
[General Appearance - Well Developed] : well developed [Normal Appearance] : normal appearance [Well Groomed] : well groomed [General Appearance - Well Nourished] : well nourished [No Deformities] : no deformities [General Appearance - In No Acute Distress] : no acute distress [Normal Conjunctiva] : the conjunctiva exhibited no abnormalities [Eyelids - No Xanthelasma] : the eyelids demonstrated no xanthelasmas [Normal Oral Mucosa] : normal oral mucosa [No Oral Pallor] : no oral pallor [No Oral Cyanosis] : no oral cyanosis [Respiration, Rhythm And Depth] : normal respiratory rhythm and effort [Exaggerated Use Of Accessory Muscles For Inspiration] : no accessory muscle use [Auscultation Breath Sounds / Voice Sounds] : lungs were clear to auscultation bilaterally [Heart Rate And Rhythm] : heart rate and rhythm were normal [Murmurs] : no murmurs present [Heart Sounds] : normal S1 and S2 [Abdomen Soft] : soft [Abdomen Tenderness] : non-tender [Nail Clubbing] : no clubbing of the fingernails [Cyanosis, Localized] : no localized cyanosis [Abdomen Mass (___ Cm)] : no abdominal mass palpated [Petechial Hemorrhages (___cm)] : no petechial hemorrhages [Skin Color & Pigmentation] : normal skin color and pigmentation [] : no rash [Skin Lesions] : no skin lesions [No Venous Stasis] : no venous stasis [No Skin Ulcers] : no skin ulcer [No Xanthoma] : no  xanthoma was observed [Oriented To Time, Place, And Person] : oriented to person, place, and time [Affect] : the affect was normal [Mood] : the mood was normal [No Anxiety] : not feeling anxious

## 2020-08-19 NOTE — HISTORY OF PRESENT ILLNESS
[FreeTextEntry1] :  \par 1. HTN: on medications, compliant with medication.\par 2. Pre-op: patient is for removal and replacement of penile implant.\par He denies any CP or SOB. ET is stable. He denies any palpitations or dizziness.\par He denies cough or fevers.\par \par \par

## 2020-08-19 NOTE — REASON FOR VISIT
[Follow-Up - Clinic] : a clinic follow-up of [Hypertension] : hypertension [FreeTextEntry1] : 70  M HTN for follow-up.\par  \par \par

## 2020-08-19 NOTE — DISCUSSION/SUMMARY
[FreeTextEntry1] : 70 M HTN for f/u for pre-op assessment.\par Patient is at a low level of cardiac risk for low risk procedure.\par EKG shows NSR.\par No further cardiac testing is needed.

## 2020-09-13 DIAGNOSIS — Z01.818 ENCOUNTER FOR OTHER PREPROCEDURAL EXAMINATION: ICD-10-CM

## 2020-09-14 ENCOUNTER — APPOINTMENT (OUTPATIENT)
Dept: DISASTER EMERGENCY | Facility: CLINIC | Age: 70
End: 2020-09-14

## 2020-09-15 LAB — SARS-COV-2 N GENE NPH QL NAA+PROBE: NOT DETECTED

## 2020-09-18 ENCOUNTER — OUTPATIENT (OUTPATIENT)
Dept: OUTPATIENT SERVICES | Facility: HOSPITAL | Age: 70
LOS: 1 days | Discharge: HOME | End: 2020-09-18

## 2020-09-18 DIAGNOSIS — Z90.79 ACQUIRED ABSENCE OF OTHER GENITAL ORGAN(S): Chronic | ICD-10-CM

## 2020-09-18 DIAGNOSIS — Z90.49 ACQUIRED ABSENCE OF OTHER SPECIFIED PARTS OF DIGESTIVE TRACT: Chronic | ICD-10-CM

## 2020-11-02 ENCOUNTER — APPOINTMENT (OUTPATIENT)
Dept: DISASTER EMERGENCY | Facility: CLINIC | Age: 70
End: 2020-11-02

## 2020-11-02 ENCOUNTER — LABORATORY RESULT (OUTPATIENT)
Age: 70
End: 2020-11-02

## 2020-11-06 ENCOUNTER — OUTPATIENT (OUTPATIENT)
Dept: OUTPATIENT SERVICES | Facility: HOSPITAL | Age: 70
LOS: 1 days | Discharge: HOME | End: 2020-11-06
Payer: MEDICARE

## 2020-11-06 DIAGNOSIS — Z90.79 ACQUIRED ABSENCE OF OTHER GENITAL ORGAN(S): Chronic | ICD-10-CM

## 2020-11-06 DIAGNOSIS — Z90.49 ACQUIRED ABSENCE OF OTHER SPECIFIED PARTS OF DIGESTIVE TRACT: Chronic | ICD-10-CM

## 2020-11-06 LAB
GRAM STN FLD: SIGNIFICANT CHANGE UP
SPECIMEN SOURCE: SIGNIFICANT CHANGE UP

## 2020-11-08 LAB
CULTURE RESULTS: SIGNIFICANT CHANGE UP
CULTURE RESULTS: SIGNIFICANT CHANGE UP
SPECIMEN SOURCE: SIGNIFICANT CHANGE UP
SPECIMEN SOURCE: SIGNIFICANT CHANGE UP

## 2020-11-09 LAB
CULTURE RESULTS: SIGNIFICANT CHANGE UP
SPECIMEN SOURCE: SIGNIFICANT CHANGE UP

## 2020-11-10 LAB
CULTURE RESULTS: NO GROWTH — SIGNIFICANT CHANGE UP
SPECIMEN SOURCE: SIGNIFICANT CHANGE UP

## 2020-11-20 ENCOUNTER — RESULT REVIEW (OUTPATIENT)
Age: 70
End: 2020-11-20

## 2020-11-20 PROCEDURE — 88304 TISSUE EXAM BY PATHOLOGIST: CPT | Mod: 26

## 2020-11-24 LAB — SURGICAL PATHOLOGY STUDY: SIGNIFICANT CHANGE UP

## 2021-03-05 NOTE — DISCHARGE NOTE PROVIDER - REASON FOR ADMISSION
Referred by: Stephon Mckeon MD; Medical Diagnosis (from order):    Diagnosis Information      Diagnosis    S63.41 (ICD-10-CM) - Traumatic rupture of collateral ligament of finger at metacarpophalangeal and interphalangeal joint                Daily Treatment Note    Visit:  3     SUBJECTIVE                                                                                                             Pt indicates at end of rx he will see doctor today.      OBJECTIVE                                                                                                                     Range of Motion (ROM)   (degrees unless noted; active unless noted; norms in ( ); negative=lacking to 0, positive=beyond 0)   Details / Comments: WFL flex AROM all digits 2-5       TREATMENT                                                                                                                  Therapeutic Exercise:  A/PROM digits    Manual Therapy:  STM and scar massage with and without minivibration    Therapeutic Activity:  Blue power web ext stretch wrist    Skilled input: verbal instruction/cues    Writer verbally educated and received verbal consent for hand placement, positioning of patient, and techniques to be performed today from patient for modality application as described above and how they are pertinent to the patient's plan of care.      Fluidotherapy (95914)  Location: R UE  Temperature: 115° F  Duration: 15 minutes  Ultrasound (56894)  Duty Cycle: 100%  Frequency: 1 Mhz  Intensity (w/cm2): 1.0  Duration: 8 minutes  Results: tissue softening      ASSESSMENT                                                                                                             OT initiated fluido. Today. Pt found it beneficial and that it helped with stiffness.  Pt. Still with mod. Scar tissue along incision digit.  Pt.'s AROM is WFL  For flex. Digits 2-5 at this time.  Pain/symptoms after session: 0           Therapy procedure time and  total treatment time can be found documented on the Time Entry flowsheet   Fever, cough, hiccups

## 2021-04-23 ENCOUNTER — APPOINTMENT (OUTPATIENT)
Dept: UROLOGY | Facility: CLINIC | Age: 71
End: 2021-04-23
Payer: MEDICARE

## 2021-04-23 VITALS
BODY MASS INDEX: 31.61 KG/M2 | SYSTOLIC BLOOD PRESSURE: 157 MMHG | HEART RATE: 60 BPM | RESPIRATION RATE: 16 BRPM | HEIGHT: 60 IN | TEMPERATURE: 97.3 F | DIASTOLIC BLOOD PRESSURE: 98 MMHG | WEIGHT: 161 LBS

## 2021-04-23 DIAGNOSIS — N52.9 MALE ERECTILE DYSFUNCTION, UNSPECIFIED: ICD-10-CM

## 2021-04-23 PROCEDURE — 99213 OFFICE O/P EST LOW 20 MIN: CPT

## 2021-04-23 NOTE — REASON FOR VISIT
[Follow-up Visit ___] : a follow-up visit  for [unfilled] [[employer] ] : provided by [employer]   [FreeTextEntry1] : [id number] [TWNoteComboBox1] : [place]

## 2021-04-23 NOTE — PHYSICAL EXAM
[General Appearance - Well Developed] : well developed [General Appearance - Well Nourished] : well nourished [Normal Appearance] : normal appearance [Well Groomed] : well groomed [General Appearance - In No Acute Distress] : no acute distress [Urinary Bladder Findings] : the bladder was normal on palpation [] : no respiratory distress [Respiration, Rhythm And Depth] : normal respiratory rhythm and effort [Exaggerated Use Of Accessory Muscles For Inspiration] : no accessory muscle use [FreeTextEntry1] : malleable prosthesis palpable in R corpora, distal tip at mid/distal shaft proximal to glans.  Severe corporal fibrosis and scrotal skin scarring noted

## 2021-04-23 NOTE — ASSESSMENT
[FreeTextEntry1] : Patient is a 69 yo M who presents for ED, malleable R cylinder.\par \par D/w pt that I do not feel that it would possible to place a IPP or b/l cylinders as per pt desires given his exam and history.\par D/w pt that he is at very high risk of complications, infections.\par D/w pt that if Dr Park states he can place a longer implant in his R cylinder, then he should consider in order to have sex.  He is concerned about imbalance - I d/w pt that there should not be an issue for penetration.\par F/u PRN.

## 2021-04-23 NOTE — HISTORY OF PRESENT ILLNESS
[FreeTextEntry1] : Patient is a 69 yo M h/o penile prosthesis placement by Dr. Harry Marshall 4/2019, with replacement 7/2019 because 'wasn't working well', and finally explant of prosthesis in [place] approx 8/13/2019 (reservoir in place).  He presented to [facility] ~ 12/2019 with high fever- found to have residual rear-extender which appeared to have eroded through dorsal corpora with inflammation.  This was removed.  \par Since then he has severe ED and is interested in replacement of IPP.  He did have Dr Park in [place] perform surgery for him, he has a R malleable cylinder/implant.  He had apparently L malleable also placed but was removed due to infection. He is unable to have sex with it.

## 2021-09-08 ENCOUNTER — APPOINTMENT (OUTPATIENT)
Dept: CARDIOLOGY | Facility: CLINIC | Age: 71
End: 2021-09-08
Payer: MEDICARE

## 2021-09-08 VITALS
RESPIRATION RATE: 12 BRPM | HEIGHT: 60 IN | DIASTOLIC BLOOD PRESSURE: 76 MMHG | BODY MASS INDEX: 31.41 KG/M2 | HEART RATE: 62 BPM | SYSTOLIC BLOOD PRESSURE: 110 MMHG | WEIGHT: 160 LBS

## 2021-09-08 PROCEDURE — 93000 ELECTROCARDIOGRAM COMPLETE: CPT

## 2021-09-08 PROCEDURE — 99214 OFFICE O/P EST MOD 30 MIN: CPT

## 2021-09-08 NOTE — HISTORY OF PRESENT ILLNESS
[FreeTextEntry1] :  \par 1. HTN: on medications, controlled.\par 2. Pre-op: patient is for removal and replacement of penile implant.\par \par He denies any new symptoms. Patient denies CP or SOB. \par \par No cough or fevers noted. ET is stable. \par \par Patient is compliant with medicaitons.\par \par Patient reports good ET.\par \par He received his COVID vaccine.

## 2021-09-08 NOTE — DISCUSSION/SUMMARY
[FreeTextEntry1] : 71 M HTN for pre-op assessment.\par PATIENT IS AT A LOW LEVEL OF CARDIAC RISK FOR LOW RISK SURGERY.\par EKG SHOWS NSR. NO FURTHER CARDIAC TESTING IS INDICATED.\par Continue medications for HTN.

## 2021-09-10 ENCOUNTER — RESULT REVIEW (OUTPATIENT)
Age: 71
End: 2021-09-10

## 2021-09-10 ENCOUNTER — OUTPATIENT (OUTPATIENT)
Dept: OUTPATIENT SERVICES | Facility: HOSPITAL | Age: 71
LOS: 1 days | Discharge: HOME | End: 2021-09-10
Payer: MEDICARE

## 2021-09-10 DIAGNOSIS — Z90.79 ACQUIRED ABSENCE OF OTHER GENITAL ORGAN(S): Chronic | ICD-10-CM

## 2021-09-10 DIAGNOSIS — Z90.49 ACQUIRED ABSENCE OF OTHER SPECIFIED PARTS OF DIGESTIVE TRACT: Chronic | ICD-10-CM

## 2021-09-10 PROCEDURE — 88300 SURGICAL PATH GROSS: CPT | Mod: 26

## 2021-09-19 ENCOUNTER — RESULT REVIEW (OUTPATIENT)
Age: 71
End: 2021-09-19

## 2021-09-20 ENCOUNTER — INPATIENT (INPATIENT)
Facility: HOSPITAL | Age: 71
LOS: 6 days | Discharge: HOME | DRG: 673 | End: 2021-09-27
Attending: SPECIALIST | Admitting: SPECIALIST
Payer: MEDICARE

## 2021-09-20 VITALS
RESPIRATION RATE: 18 BRPM | TEMPERATURE: 100 F | HEART RATE: 71 BPM | WEIGHT: 158.07 LBS | OXYGEN SATURATION: 97 % | DIASTOLIC BLOOD PRESSURE: 93 MMHG | SYSTOLIC BLOOD PRESSURE: 135 MMHG | HEIGHT: 68 IN

## 2021-09-20 DIAGNOSIS — Z90.79 ACQUIRED ABSENCE OF OTHER GENITAL ORGAN(S): Chronic | ICD-10-CM

## 2021-09-20 DIAGNOSIS — Z90.49 ACQUIRED ABSENCE OF OTHER SPECIFIED PARTS OF DIGESTIVE TRACT: Chronic | ICD-10-CM

## 2021-09-20 DIAGNOSIS — Z96.0 PRESENCE OF UROGENITAL IMPLANTS: Chronic | ICD-10-CM

## 2021-09-20 LAB
ALBUMIN SERPL ELPH-MCNC: 4.5 G/DL — SIGNIFICANT CHANGE UP (ref 3.3–5)
ALP SERPL-CCNC: 71 U/L — SIGNIFICANT CHANGE UP (ref 40–120)
ALT FLD-CCNC: 11 U/L — SIGNIFICANT CHANGE UP (ref 10–45)
ANION GAP SERPL CALC-SCNC: 9 MMOL/L — SIGNIFICANT CHANGE UP (ref 5–17)
APPEARANCE UR: CLEAR — SIGNIFICANT CHANGE UP
AST SERPL-CCNC: 18 U/L — SIGNIFICANT CHANGE UP (ref 10–40)
BACTERIA # UR AUTO: PRESENT /HPF
BASE EXCESS BLDV CALC-SCNC: -0.7 MMOL/L — SIGNIFICANT CHANGE UP (ref -2–3)
BASOPHILS # BLD AUTO: 0.02 K/UL — SIGNIFICANT CHANGE UP (ref 0–0.2)
BASOPHILS NFR BLD AUTO: 0.1 % — SIGNIFICANT CHANGE UP (ref 0–2)
BILIRUB SERPL-MCNC: 1.9 MG/DL — HIGH (ref 0.2–1.2)
BILIRUB UR-MCNC: NEGATIVE — SIGNIFICANT CHANGE UP
BUN SERPL-MCNC: 18 MG/DL — SIGNIFICANT CHANGE UP (ref 7–23)
CALCIUM SERPL-MCNC: 9.2 MG/DL — SIGNIFICANT CHANGE UP (ref 8.4–10.5)
CHLORIDE SERPL-SCNC: 96 MMOL/L — SIGNIFICANT CHANGE UP (ref 96–108)
CO2 BLDV-SCNC: 25.4 MMOL/L — SIGNIFICANT CHANGE UP (ref 22–26)
CO2 SERPL-SCNC: 31 MMOL/L — SIGNIFICANT CHANGE UP (ref 22–31)
COLOR SPEC: YELLOW — SIGNIFICANT CHANGE UP
CREAT SERPL-MCNC: 1.09 MG/DL — SIGNIFICANT CHANGE UP (ref 0.5–1.3)
DIFF PNL FLD: ABNORMAL
EOSINOPHIL # BLD AUTO: 0.02 K/UL — SIGNIFICANT CHANGE UP (ref 0–0.5)
EOSINOPHIL NFR BLD AUTO: 0.1 % — SIGNIFICANT CHANGE UP (ref 0–6)
EPI CELLS # UR: SIGNIFICANT CHANGE UP /HPF (ref 0–5)
GLUCOSE BLDC GLUCOMTR-MCNC: 91 MG/DL — SIGNIFICANT CHANGE UP (ref 70–99)
GLUCOSE SERPL-MCNC: 145 MG/DL — HIGH (ref 70–99)
GLUCOSE UR QL: NEGATIVE — SIGNIFICANT CHANGE UP
HCO3 BLDV-SCNC: 24 MMOL/L — SIGNIFICANT CHANGE UP (ref 22–29)
HCT VFR BLD CALC: 47 % — SIGNIFICANT CHANGE UP (ref 39–50)
HGB BLD-MCNC: 15.7 G/DL — SIGNIFICANT CHANGE UP (ref 13–17)
IMM GRANULOCYTES NFR BLD AUTO: 0.4 % — SIGNIFICANT CHANGE UP (ref 0–1.5)
KETONES UR-MCNC: NEGATIVE — SIGNIFICANT CHANGE UP
LACTATE SERPL-SCNC: 1.3 MMOL/L — SIGNIFICANT CHANGE UP (ref 0.5–2)
LACTATE SERPL-SCNC: 16 MMOL/L — CRITICAL HIGH (ref 0.5–2)
LACTATE SERPL-SCNC: 2.3 MMOL/L — HIGH (ref 0.5–2)
LACTATE SERPL-SCNC: 6.1 MMOL/L — CRITICAL HIGH (ref 0.5–2)
LEUKOCYTE ESTERASE UR-ACNC: NEGATIVE — SIGNIFICANT CHANGE UP
LYMPHOCYTES # BLD AUTO: 0.94 K/UL — LOW (ref 1–3.3)
LYMPHOCYTES # BLD AUTO: 5.8 % — LOW (ref 13–44)
MCHC RBC-ENTMCNC: 33.4 GM/DL — SIGNIFICANT CHANGE UP (ref 32–36)
MCHC RBC-ENTMCNC: 33.4 PG — SIGNIFICANT CHANGE UP (ref 27–34)
MCV RBC AUTO: 100 FL — SIGNIFICANT CHANGE UP (ref 80–100)
MONOCYTES # BLD AUTO: 0.87 K/UL — SIGNIFICANT CHANGE UP (ref 0–0.9)
MONOCYTES NFR BLD AUTO: 5.3 % — SIGNIFICANT CHANGE UP (ref 2–14)
NEUTROPHILS # BLD AUTO: 14.41 K/UL — HIGH (ref 1.8–7.4)
NEUTROPHILS NFR BLD AUTO: 88.3 % — HIGH (ref 43–77)
NITRITE UR-MCNC: POSITIVE
NRBC # BLD: 0 /100 WBCS — SIGNIFICANT CHANGE UP (ref 0–0)
PCO2 BLDV: 40 MMHG — LOW (ref 42–55)
PH BLDV: 7.39 — SIGNIFICANT CHANGE UP (ref 7.32–7.43)
PH UR: 6 — SIGNIFICANT CHANGE UP (ref 5–8)
PLATELET # BLD AUTO: 171 K/UL — SIGNIFICANT CHANGE UP (ref 150–400)
PO2 BLDV: 48 MMHG — HIGH (ref 25–45)
POTASSIUM SERPL-MCNC: 3.8 MMOL/L — SIGNIFICANT CHANGE UP (ref 3.5–5.3)
POTASSIUM SERPL-SCNC: 3.8 MMOL/L — SIGNIFICANT CHANGE UP (ref 3.5–5.3)
PROT SERPL-MCNC: 8 G/DL — SIGNIFICANT CHANGE UP (ref 6–8.3)
PROT UR-MCNC: NEGATIVE MG/DL — SIGNIFICANT CHANGE UP
RBC # BLD: 4.7 M/UL — SIGNIFICANT CHANGE UP (ref 4.2–5.8)
RBC # FLD: 11.9 % — SIGNIFICANT CHANGE UP (ref 10.3–14.5)
RBC CASTS # UR COMP ASSIST: < 5 /HPF — SIGNIFICANT CHANGE UP
SAO2 % BLDV: 81.9 % — SIGNIFICANT CHANGE UP (ref 67–88)
SARS-COV-2 RNA SPEC QL NAA+PROBE: NEGATIVE — SIGNIFICANT CHANGE UP
SODIUM SERPL-SCNC: 136 MMOL/L — SIGNIFICANT CHANGE UP (ref 135–145)
SP GR SPEC: 1.01 — SIGNIFICANT CHANGE UP (ref 1–1.03)
SURGICAL PATHOLOGY STUDY: SIGNIFICANT CHANGE UP
UROBILINOGEN FLD QL: 0.2 E.U./DL — SIGNIFICANT CHANGE UP
WBC # BLD: 16.33 K/UL — HIGH (ref 3.8–10.5)
WBC # FLD AUTO: 16.33 K/UL — HIGH (ref 3.8–10.5)
WBC UR QL: < 5 /HPF — SIGNIFICANT CHANGE UP

## 2021-09-20 PROCEDURE — 71045 X-RAY EXAM CHEST 1 VIEW: CPT | Mod: 26

## 2021-09-20 PROCEDURE — 88300 SURGICAL PATH GROSS: CPT | Mod: 26

## 2021-09-20 PROCEDURE — 93010 ELECTROCARDIOGRAM REPORT: CPT

## 2021-09-20 PROCEDURE — 99285 EMERGENCY DEPT VISIT HI MDM: CPT

## 2021-09-20 RX ORDER — SODIUM CHLORIDE 9 MG/ML
1000 INJECTION INTRAMUSCULAR; INTRAVENOUS; SUBCUTANEOUS
Refills: 0 | Status: DISCONTINUED | OUTPATIENT
Start: 2021-09-20 | End: 2021-09-21

## 2021-09-20 RX ORDER — PIPERACILLIN AND TAZOBACTAM 4; .5 G/20ML; G/20ML
3.38 INJECTION, POWDER, LYOPHILIZED, FOR SOLUTION INTRAVENOUS ONCE
Refills: 0 | Status: COMPLETED | OUTPATIENT
Start: 2021-09-20 | End: 2021-09-20

## 2021-09-20 RX ORDER — SODIUM CHLORIDE 9 MG/ML
1000 INJECTION INTRAMUSCULAR; INTRAVENOUS; SUBCUTANEOUS ONCE
Refills: 0 | Status: COMPLETED | OUTPATIENT
Start: 2021-09-20 | End: 2021-09-20

## 2021-09-20 RX ORDER — SENNA PLUS 8.6 MG/1
2 TABLET ORAL AT BEDTIME
Refills: 0 | Status: DISCONTINUED | OUTPATIENT
Start: 2021-09-20 | End: 2021-09-20

## 2021-09-20 RX ORDER — ACETAMINOPHEN 500 MG
1000 TABLET ORAL ONCE
Refills: 0 | Status: COMPLETED | OUTPATIENT
Start: 2021-09-20 | End: 2021-09-20

## 2021-09-20 RX ORDER — OXYCODONE AND ACETAMINOPHEN 5; 325 MG/1; MG/1
1 TABLET ORAL EVERY 6 HOURS
Refills: 0 | Status: DISCONTINUED | OUTPATIENT
Start: 2021-09-20 | End: 2021-09-21

## 2021-09-20 RX ORDER — VANCOMYCIN HCL 1 G
1000 VIAL (EA) INTRAVENOUS EVERY 12 HOURS
Refills: 0 | Status: DISCONTINUED | OUTPATIENT
Start: 2021-09-21 | End: 2021-09-21

## 2021-09-20 RX ORDER — VANCOMYCIN HCL 1 G
1000 VIAL (EA) INTRAVENOUS ONCE
Refills: 0 | Status: COMPLETED | OUTPATIENT
Start: 2021-09-20 | End: 2021-09-20

## 2021-09-20 RX ORDER — CEPHALEXIN 500 MG
1 CAPSULE ORAL
Qty: 0 | Refills: 0 | DISCHARGE

## 2021-09-20 RX ORDER — LOSARTAN/HYDROCHLOROTHIAZIDE 100MG-25MG
1 TABLET ORAL
Qty: 0 | Refills: 0 | DISCHARGE

## 2021-09-20 RX ORDER — ACETAMINOPHEN 500 MG
650 TABLET ORAL EVERY 6 HOURS
Refills: 0 | Status: DISCONTINUED | OUTPATIENT
Start: 2021-09-20 | End: 2021-09-21

## 2021-09-20 RX ORDER — PIPERACILLIN AND TAZOBACTAM 4; .5 G/20ML; G/20ML
3.38 INJECTION, POWDER, LYOPHILIZED, FOR SOLUTION INTRAVENOUS EVERY 6 HOURS
Refills: 0 | Status: DISCONTINUED | OUTPATIENT
Start: 2021-09-20 | End: 2021-09-21

## 2021-09-20 RX ADMIN — SODIUM CHLORIDE 1000 MILLILITER(S): 9 INJECTION INTRAMUSCULAR; INTRAVENOUS; SUBCUTANEOUS at 16:04

## 2021-09-20 RX ADMIN — Medication 400 MILLIGRAM(S): at 19:50

## 2021-09-20 RX ADMIN — Medication 1000 MILLIGRAM(S): at 16:04

## 2021-09-20 RX ADMIN — Medication 1000 MILLIGRAM(S): at 22:51

## 2021-09-20 RX ADMIN — Medication 250 MILLIGRAM(S): at 18:39

## 2021-09-20 RX ADMIN — SODIUM CHLORIDE 125 MILLILITER(S): 9 INJECTION INTRAMUSCULAR; INTRAVENOUS; SUBCUTANEOUS at 20:35

## 2021-09-20 RX ADMIN — PIPERACILLIN AND TAZOBACTAM 200 GRAM(S): 4; .5 INJECTION, POWDER, LYOPHILIZED, FOR SOLUTION INTRAVENOUS at 16:05

## 2021-09-20 RX ADMIN — PIPERACILLIN AND TAZOBACTAM 200 GRAM(S): 4; .5 INJECTION, POWDER, LYOPHILIZED, FOR SOLUTION INTRAVENOUS at 22:55

## 2021-09-20 NOTE — H&P ADULT - HISTORY OF PRESENT ILLNESS
Patient is a 71y old  Male who presents with a chief complaint of fever 101 s/p insertion of malleable penile prosthesis.      HPI:     Patient is a 71M w/ PMH of  HTN, prostate CA (s/p prostatectomy 2011), prior penile implants w/post-op infections sent in by urologist Dr. Park for post-op infection.  Patient had a penile prosthesis removal and replaced w/ a malleable prosthesis 9/10/21    Vital Signs Last 24 Hrs  T(C): 38.1 (20 Sep 2021 15:55), Max: 38.1 (20 Sep 2021 15:55)  T(F): 100.6 (20 Sep 2021 15:55), Max: 100.6 (20 Sep 2021 15:55)  HR: 83 (20 Sep 2021 15:55) (71 - 83)  BP: 120/73 (20 Sep 2021 15:55) (120/73 - 135/93)  BP(mean): --  RR: 18 (20 Sep 2021 15:55) (18 - 18)  SpO2: 96% (20 Sep 2021 15:55) (96% - 97%)  I&O's Summary      PE:  Gen:  Abd:  :  RONY:    LABS:                        15.7   16.33 )-----------( 171      ( 20 Sep 2021 15:10 )             47.0     09-20    136  |  96  |  18  ----------------------------<  145<H>  3.8   |  31  |  1.09    Ca    9.2      20 Sep 2021 15:11    TPro  8.0  /  Alb  4.5  /  TBili  1.9<H>  /  DBili  x   /  AST  18  /  ALT  11  /  AlkPhos  71  09-20      Cultures      A/P Patient is a 71y old  Male who presents with a chief complaint of fever 101 s/p insertion of malleable penile prosthesis.      HPI:     Patient is a 71M w/ PMH of  HTN, prostate CA (s/p prostatectomy 2011), prior penile implants w/post-op infections sent in by urologist Dr. Park for post-op infection.  Patient had a penile prosthesis removal and replaced w/ a malleable prosthesis 9/10/21.  Today patient comes to ED due to Fever 101 at home, w/ associated discharge from left incision, of mild purulent output, w/ serosanguinous fluid.     Vital Signs Last 24 Hrs  T(C): 38.1 (20 Sep 2021 15:55), Max: 38.1 (20 Sep 2021 15:55)  T(F): 100.6 (20 Sep 2021 15:55), Max: 100.6 (20 Sep 2021 15:55)  HR: 83 (20 Sep 2021 15:55) (71 - 83)  BP: 120/73 (20 Sep 2021 15:55) (120/73 - 135/93)  BP(mean): --  RR: 18 (20 Sep 2021 15:55) (18 - 18)  SpO2: 96% (20 Sep 2021 15:55) (96% - 97%)  I&O's Summary      PE:  Gen:  Abd:  :  RONY:    LABS:                        15.7   16.33 )-----------( 171      ( 20 Sep 2021 15:10 )             47.0     09-20    136  |  96  |  18  ----------------------------<  145<H>  3.8   |  31  |  1.09    Ca    9.2      20 Sep 2021 15:11    TPro  8.0  /  Alb  4.5  /  TBili  1.9<H>  /  DBili  x   /  AST  18  /  ALT  11  /  AlkPhos  71  09-20      Cultures      A/P

## 2021-09-20 NOTE — ED PROVIDER NOTE - NSICDXPASTSURGICALHX_GEN_ALL_CORE_FT
PAST SURGICAL HISTORY:  History of penile implant     S/P appendectomy 20y/o    S/P prostatectomy 2011

## 2021-09-20 NOTE — ED PROVIDER NOTE - PHYSICAL EXAMINATION
CONSTITUTIONAL: Well-appearing, non-toxic; in no apparent distress.   HEAD: Normocephalic; atraumatic.   EYES:  conjunctiva and sclera clear  ENT: normal nose; no rhinorrhea; normal pharynx with no erythema or lesions.   NECK: Supple; non-tender;   CARDIOVASCULAR: Normal S1, S2; no murmurs, rubs, or gallops. Regular rate and rhythm.   RESPIRATORY: Breathing easily; breath sounds clear and equal bilaterally; no wheezes, rhonchi, or rales.  GI: Soft; non-distended; non-tender; no palpable organomegaly.   : Blood/purulence expressed from L surgical site. Erythematous and warm close to site. No crepitus. No CVA tenderness b/l.   EXT: No cyanosis or edema; N/V intact  SKIN: Normal for age and race; warm; dry; no apparent lesions or rash.   NEURO: A & O x 3; face symmetric; grossly unremarkable.   PSYCHOLOGICAL: The patient’s mood and manner are appropriate.

## 2021-09-20 NOTE — ED ADULT TRIAGE NOTE - CHIEF COMPLAINT QUOTE
Pt presents 2 wks s/p penile implant. Pt sent in by surgeon today w/ concern for infection. Fever last night of 101.2.

## 2021-09-20 NOTE — ED ADULT NURSE NOTE - BREATHING
SS enema given. Pt able to retain contents. When expelled,brown water was returned without a trace of stool. spontaneous

## 2021-09-20 NOTE — CHART NOTE - NSCHARTNOTEFT_GEN_A_CORE
Sepsis Flow Sheet MUST BE COMPLETED BY PROVIDER ACCORDING TO CMS GUIDELINES ALL SECTIONS MUST BE COMPLETED    [X] Source of Infection: Penile Implant				  -----------------------------------------------------------------------------------------------------------------------------------------------------------------------------------  [ ] 2 SIRS Criteria Met:    [X] HR> 90 bpm  [X] RR >20 rpm  [X] Temp > 100.9 F  [X] WBC > 12, WBC<4, or Band neutrophils >   ----------------------------------------------------------------------------------------------------------------------------------------------------------------------------------  [ ] 1 End Organ Dysfunction:     [X] Lactate > 2, then [X] Repeat Lactate at 2200  [ ] Lactate >4, then [ ] Repeat Lactate  [X] Sys BP < 90 mmHg  [ ] MAP < 65 mmHg  [ ] Creatinine > 2.0  or >0.5 above baseline  [ ] Mechanical vent   [ ] Platelets <1000,000  [ ] Bilirubin > 2 mg/dL  [ ] INR > 1.5   [ ] Urine Output < 0.5 mL/kg/hr for 2 hours   -----------------------------------------------------------------------------------------------------------------------------------------------------------------------------------  [X] Blood cultures drawn from 2 sites:     [X] Drawn before abx admin    [X] Site #1, Time drawn:			    [X] Site #2, Time drawn:			  -----------------------------------------------------------------------------------------------------------------------------------------------------------------------------------  [X] IVF    [X] Yes, 30 cc/kg administered AND I have re-evaluated the patient’s fluid status and reviewed vital signs. Clinical perfusion assessment was performed. 2000    [ ] No, contraindicated due to: [ ] CHF  			                               [ ] Pulm edema  			                               [ ] LVAD  			                               [ ] ESRD  			                               [ ] COVID-19  -----------------------------------------------------------------------------------------------------------------------------------------------------------------------------------  [X] Antibiotics    [X] Zosyn  [ ] Ceftriaxone  [ ] Levaquin  [ ] Meropenem  [ ] Maxipime  [ ] Unasyn   [X] Other: Vancomycin  -----------------------------------------------------------------------------------------------------------------------------------------------------------------------------------	  Septic Shock:    [X] Repeat volume status and tissue reperfusion assessment performed after administration of crystalloid fluids Sepsis Flow Sheet MUST BE COMPLETED BY PROVIDER ACCORDING TO CMS GUIDELINES ALL SECTIONS MUST BE COMPLETED    [X] Source of Infection: Penile Implant				  -----------------------------------------------------------------------------------------------------------------------------------------------------------------------------------  [ ] 2 SIRS Criteria Met:    [X] HR> 90 bpm  [X] RR >20 rpm  [X] Temp > 100.9 F  [X] WBC > 12, WBC<4, or Band neutrophils >   ----------------------------------------------------------------------------------------------------------------------------------------------------------------------------------  [ ] 1 End Organ Dysfunction:     [X] Lactate > 2, then [X] Repeat Lactate at 2200  [ ] Lactate >4, then [ ] Repeat Lactate  [X] Sys BP < 90 mmHg  [ ] MAP < 65 mmHg  [ ] Creatinine > 2.0  or >0.5 above baseline  [ ] Mechanical vent   [ ] Platelets <1000,000  [ ] Bilirubin > 2 mg/dL  [ ] INR > 1.5   [ ] Urine Output < 0.5 mL/kg/hr for 2 hours   -----------------------------------------------------------------------------------------------------------------------------------------------------------------------------------  [X] Blood cultures drawn from 2 sites:     [X] Drawn before abx admin    [X] Site #1, Time drawn: 1504		    [X] Site #2, Time drawn: 1504			  -----------------------------------------------------------------------------------------------------------------------------------------------------------------------------------  [X] IVF    [X] Yes, 30 cc/kg administered AND I have re-evaluated the patient’s fluid status and reviewed vital signs. Clinical perfusion assessment was performed. 2000    [ ] No, contraindicated due to: [ ] CHF  			                               [ ] Pulm edema  			                               [ ] LVAD  			                               [ ] ESRD  			                               [ ] COVID-19  -----------------------------------------------------------------------------------------------------------------------------------------------------------------------------------  [X] Antibiotics    [X] Zosyn  [ ] Ceftriaxone  [ ] Levaquin  [ ] Meropenem  [ ] Maxipime  [ ] Unasyn   [X] Other: Vancomycin  -----------------------------------------------------------------------------------------------------------------------------------------------------------------------------------	  Septic Shock:    [X] Repeat volume status and tissue reperfusion assessment performed after administration of crystalloid fluids

## 2021-09-20 NOTE — ED ADULT NURSE REASSESSMENT NOTE - NS ED NURSE REASSESS COMMENT FT1
20:00: Rapid response called by Bernabe [facility]RIET[facility] Urology d/t pt being unresponsive. Pt shivering on exam. Rapid response team arrived, repeat lactate and VBG drawn. 3rd IV placed in pt. Urology team at bedside for bedside stephenson, stephenson placed by urology. Pt's family member arrives at bedside. Upon exam pt states "please don't do that" while trying to place IV, pt responsive. Will continue to monitor.

## 2021-09-20 NOTE — ED PROVIDER NOTE - CLINICAL SUMMARY MEDICAL DECISION MAKING FREE TEXT BOX
Patient in ED as directed to urology for pre op admission 2/2 concern for infected penile implant.  Labs, CXR, cultures, etc ordered.  Patient covered with broad spec abx as requested by urology.  Will admit to regional floor under Dr. Park at this time.  Patient is aware of plan and in agreement, will admit at this time.

## 2021-09-20 NOTE — CHART NOTE - NSCHARTNOTEFT_GEN_A_CORE
***Rapid Response Clinical Impact Nurse Practitioner Note***    Patient is a 71y old  Male with a PMHx of  HTN, prostate CA (s/p prostatectomy 2011), prior penile implants w/post-op infections sent in by urologist Dr. Park for post-op infection.  Patient had a penile prosthesis removal and replaced w/ a malleable prosthesis 9/10/21.  Today patient comes to ED due to Fever 101 at home, w/ associated discharge from left incision, of mild purulent output, w/ serosanguinous fluid.     Now this evening a RRT was called for AMS with tachycardia, fever, and hypotension responsive to fluid. Per primary team patient was awaiting for regional surgical bed when called to bedside for AMS and tachycardia. Was found to be febrile to 101.6. BG 98. Patient received Ofirmev IV prior to RRT arrival. SBP in the high 80's but MAP 67.     On arrival patient found to be responsive to painful stimulus but stuporous when aroused but falling back to sleep. Patient not interacting with  on phone. Protecting airway. Large bore IV access obtained. Patient received 1 liter of NS prior in ED today. Given 2nd liter of NS for volume resuscitation. Passive cooling under groin and armpits. Patient began to awaken more during volume resuscitation.    Per patient "I was shaking for 40 minutes before this." Staff describe patient rigoring with fevers and became more altered. Baseline awake, alert, and oriented X 4, primarily Georgian speaker but speaks some English.    Patient awake and back to baseline. Wife at bedside. Plan of care discussed with wife.     Review of systems: Patient complaining of chills. Denies any chest pain or shortness of breath.     Vital Signs Last 24 Hrs  T(C): 38.1 (20 Sep 2021 15:55), Max: 38.1 (20 Sep 2021 15:55)  T(F): 100.6 (20 Sep 2021 15:55), Max: 100.6 (20 Sep 2021 15:55)  HR: 83 (20 Sep 2021 15:55) (71 - 83)  BP: 120/73 (20 Sep 2021 15:55) (120/73 - 135/93)  BP(mean): --  RR: 18 (20 Sep 2021 15:55) (18 - 18)  SpO2: 96% (20 Sep 2021 15:55) (96% - 97%)    Physical Exam:  General: Adult male lying supine in hospital bed stuporous initially, now awake and alert X 4.   HEENT: +PERRLA, MM Dry. Trachea Midline. No JVD.  Pulm: Good bilateral chest rise. LS Clear bilaterally. Speaking full sentences.  Cardiac: S1, S2. Sinus Tachycardia on telemetry.   Abdomen: SNT to palpation. Bladder distended and tender.   : Prosthetic implant with dressing and drainage noted. Unable to assess drainage.   MSK: +CMS X 4. No edema.  Skin: Hot and moist.     LABS:                        15.7   16.33 )-----------( 171      ( 20 Sep 2021 15:10 )             47.0     09-20    136  |  96  |  18  ----------------------------<  145<H>  3.8   |  31  |  1.09    Ca    9.2      20 Sep 2021 15:11    TPro  8.0  /  Alb  4.5  /  TBili  1.9<H>  /  DBili  x   /  AST  18  /  ALT  11  /  AlkPhos  71  09-20      Cultures    MEDICATIONS  (STANDING):  piperacillin/tazobactam IVPB.. 3.375 Gram(s) IV Intermittent every 6 hours  senna 2 Tablet(s) Oral at bedtime  sodium chloride 0.9%. 1000 milliLiter(s) (125 mL/Hr) IV Continuous <Continuous>  vancomycin  IVPB 1000 milliGRAM(s) IV Intermittent every 12 hours    MEDICATIONS  (PRN):  acetaminophen   Tablet .. 650 milliGRAM(s) Oral every 6 hours PRN Temp greater or equal to 38C (100.4F), Mild Pain (1 - 3)  oxycodone    5 mG/acetaminophen 325 mG 1 Tablet(s) Oral every 6 hours PRN Moderate Pain (4 - 6)    POCUS: Large volume noted in bladder.     Assessment- Rapid Response called for 71y year old Male with a past medical history of     Plan- ***Rapid Response Clinical Impact Nurse Practitioner Note***    Patient is a 71y old  Male with a PMHx of  HTN, prostate CA (s/p prostatectomy 2011), prior penile implants w/post-op infections sent in by urologist Dr. Park for post-op infection.  Patient had a penile prosthesis removal and replaced w/ a malleable prosthesis 9/10/21.  Today patient comes to ED due to Fever 101 at home, w/ associated discharge from left incision, of mild purulent output, w/ serosanguinous fluid.     Now this evening a RRT was called for AMS with tachycardia, fever, and hypotension responsive to fluid. Per primary team patient was awaiting for regional surgical bed when called to bedside for AMS and tachycardia. Was found to be febrile to 101.6. BG 98. Patient received Ofirmev IV prior to RRT arrival. SBP in the high 80's but MAP 67.     On arrival patient found to be responsive to painful stimulus but stuporous when aroused but falling back to sleep. Patient not interacting with  on phone. Protecting airway. Large bore IV access obtained. Patient received 1 liter of NS prior in ED today. Given 2nd liter of NS for volume resuscitation. Passive cooling under groin and armpits. Patient began to awaken more during volume resuscitation.    Per patient "I was shaking for 40 minutes before this." Staff describe patient rigoring with fevers and became more altered. Baseline awake, alert, and oriented X 4, primarily Hungarian speaker but speaks some English.    Patient awake and back to baseline. Wife at bedside. Plan of care discussed with wife.     Review of systems: Patient complaining of chills. Denies any chest pain or shortness of breath.     Vital Signs Last 24 Hrs  T(C): 38.1 (20 Sep 2021 15:55), Max: 38.1 (20 Sep 2021 15:55)  T(F): 100.6 (20 Sep 2021 15:55), Max: 100.6 (20 Sep 2021 15:55)  HR: 83 (20 Sep 2021 15:55) (71 - 83)  BP: 120/73 (20 Sep 2021 15:55) (120/73 - 135/93)  BP(mean): --  RR: 18 (20 Sep 2021 15:55) (18 - 18)  SpO2: 96% (20 Sep 2021 15:55) (96% - 97%)    Physical Exam:  General: Adult male lying supine in hospital bed stuporous initially, now awake and alert X 4.   HEENT: +PERRLA, MM Dry. Trachea Midline. No JVD.  Pulm: Good bilateral chest rise. LS Clear bilaterally. Speaking full sentences.  Cardiac: S1, S2. Sinus Tachycardia on telemetry.   Abdomen: SNT to palpation. Bladder distended and tender.   : Prosthetic implant with dressing and drainage noted. Unable to assess drainage.   MSK: +CMS X 4. No edema.  Skin: Hot and moist.     LABS:                        15.7   16.33 )-----------( 171      ( 20 Sep 2021 15:10 )             47.0     09-20    136  |  96  |  18  ----------------------------<  145<H>  3.8   |  31  |  1.09    Ca    9.2      20 Sep 2021 15:11    TPro  8.0  /  Alb  4.5  /  TBili  1.9<H>  /  DBili  x   /  AST  18  /  ALT  11  /  AlkPhos  71  09-20      Cultures    MEDICATIONS  (STANDING):  piperacillin/tazobactam IVPB.. 3.375 Gram(s) IV Intermittent every 6 hours  senna 2 Tablet(s) Oral at bedtime  sodium chloride 0.9%. 1000 milliLiter(s) (125 mL/Hr) IV Continuous <Continuous>  vancomycin  IVPB 1000 milliGRAM(s) IV Intermittent every 12 hours    MEDICATIONS  (PRN):  acetaminophen   Tablet .. 650 milliGRAM(s) Oral every 6 hours PRN Temp greater or equal to 38C (100.4F), Mild Pain (1 - 3)  oxycodone    5 mG/acetaminophen 325 mG 1 Tablet(s) Oral every 6 hours PRN Moderate Pain (4 - 6)    POCUS: Large volume noted in bladder.     Assessment- Patient is a 71y old  Male with a PMHx of  HTN, prostate CA (s/p prostatectomy 2011), prior penile implants w/post-op infections sent in by urologist Dr. Park for post-op infection now today found with AMS secondary to rigor and sepsis with soft pressures.     -BG 98.  -POCUS with urinary retention. Berkowitz placed by Urology PA with ~ 900 ml out.   -Returned back to baseline after volume resuscitation.   -Patient received a total of 2.5 liters of crystalloid since admission for 30cc/kg.  -Status post Vanc/Zosyn.  -Cooling with ice packs.   -Status post Omfirmev.   -Chart reviewed baseline BP in the 100/70's.   -EKG sinus tachycardia.   -Lactate 2.5>16.0 (unknown if drawn appropriately and during rigors)>6.0. Repeat lactate at 2200.   -Trend lactate to clear.   -Primarily urosurgical team to take patient to OR possibly for source control.  -Upgrade to Surgical Telemetry.   -Strict I and O's.     Rest of plan of care per primary urosurgical team. Above discussed with primary nursing, primary urosurgical, urosurgical Chief, PCCM Fellow, patient, and family member at bedside.

## 2021-09-20 NOTE — ED PROVIDER NOTE - OBJECTIVE STATEMENT
71 y.o M with h/o HTN and prostate CA (s/p prostatectomy 2011) sent in by urologist Dr. Park for post-op infection. States he had penile implant x 2 on 9/10/21.  Two days ago noticed bleeding from L site and increased pain to 3-4/10 from baseline post-op pain. Took temperature last night to be 101.2F and took acetaminophen-codeine 300-30. Called Dr. Park who advised him to come to the ED. Adherent to cephalexin 250 3 pills BID since surgery. Denies drainage from L site. Denies hematuria, dysuria, flank pain, n/v, CP, SOB, palpitations, swelling of extremities, rashes, sick contacts, HA. 71 y.o M with h/o HTN, prostate CA (s/p prostatectomy 2011), prior penile implants w/post-op infections sent in by urologist Dr. Park for post-op infection. States he had penile implant x 2 on 9/10/21.  Two days ago noticed bleeding from L site and increased pain to 3-4/10 from baseline post-op pain. Took temperature last night to be 101.2F and took acetaminophen-codeine 300-30. Called Dr. Park who advised him to come to the ED. Adherent to cephalexin 250 3 pills BID since surgery. Denies drainage from L site. Denies hematuria, dysuria, flank pain, n/v, CP, SOB, palpitations, swelling of extremities, rashes, sick contacts, HA. No prior issues with anesthesia. Can walk 10,000 steps/day.

## 2021-09-20 NOTE — ED PROVIDER NOTE - NSICDXPASTMEDICALHX_GEN_ALL_CORE_FT
PAST MEDICAL HISTORY:  Hypertension     Prostate cancer s/p prostatectomy, denies chemo and radiation    Rotator cuff disorder

## 2021-09-20 NOTE — CHART NOTE - NSCHARTNOTEFT_GEN_A_CORE
PA was paged due to patient reporting rigors   Patient was immediately assessed at bedside and found to be rigoring. BP not able to obtain. Oral temperature 101.1. Ofirmev ordered STAT. Urology Resident contacted and stated he would come in to assess immediately.   patient moaning when assessed initially and not responding to questions.  Serbian  was called and patient became unresponsive.  patient unresponsive to sternal rub. Rapid response was called.   Urology resident at bedside.     see rapid response note

## 2021-09-20 NOTE — ED PROVIDER NOTE - ATTENDING CONTRIBUTION TO CARE
72 yo m w hx of HTN, prostate CA with prostectomy (2011) sent in for post op infection by urology following bilateral penile implant 2 weeks ago.  He notes bleeding from implant site and increased pain, + febrile at home.  He contacted his urology and was instructed to come to ED.  Notes history of infected implant (current implant was a revision/replacement).  Patient on outpatient prophylactic oral abx following procedure.  On exam, patient is non toxic in appearance.  HRRR, lungs clear.  Abd soft, NT/ND.   exam as per urology documentation.  Will obtain pre op labs/testing and urology requesting regional admission to Dr. Park.

## 2021-09-20 NOTE — ED ADULT NURSE REASSESSMENT NOTE - NS ED NURSE REASSESS COMMENT FT1
Urology team called, made aware that pt has temp of 100.6. Pt shaking at this time, denies chest pain, sob. Unable to give PO tylenol PRN for temp of 100.4 because PO 1000mg tylenol last given at 15:41. Pt placed on cardiac monitor.    19:50: urology arrives. Pt given IV tylenol. Pt calming down, less shaking. HR also slowing down to 108. Pt placed on oxygen 2L NC.

## 2021-09-21 DIAGNOSIS — T83.61XA INFECTION AND INFLAMMATORY REACTION DUE TO IMPLANTED PENILE PROSTHESIS, INITIAL ENCOUNTER: ICD-10-CM

## 2021-09-21 DIAGNOSIS — A41.9 SEPSIS, UNSPECIFIED ORGANISM: ICD-10-CM

## 2021-09-21 LAB
GRAM STN FLD: SIGNIFICANT CHANGE UP
SPECIMEN SOURCE: SIGNIFICANT CHANGE UP

## 2021-09-21 RX ORDER — HEPARIN SODIUM 5000 [USP'U]/ML
5000 INJECTION INTRAVENOUS; SUBCUTANEOUS EVERY 8 HOURS
Refills: 0 | Status: DISCONTINUED | OUTPATIENT
Start: 2021-09-21 | End: 2021-09-27

## 2021-09-21 RX ORDER — PIPERACILLIN AND TAZOBACTAM 4; .5 G/20ML; G/20ML
3.38 INJECTION, POWDER, LYOPHILIZED, FOR SOLUTION INTRAVENOUS ONCE
Refills: 0 | Status: COMPLETED | OUTPATIENT
Start: 2021-09-21 | End: 2021-09-21

## 2021-09-21 RX ORDER — PIPERACILLIN AND TAZOBACTAM 4; .5 G/20ML; G/20ML
3.38 INJECTION, POWDER, LYOPHILIZED, FOR SOLUTION INTRAVENOUS EVERY 6 HOURS
Refills: 0 | Status: DISCONTINUED | OUTPATIENT
Start: 2021-09-21 | End: 2021-09-22

## 2021-09-21 RX ORDER — PIPERACILLIN AND TAZOBACTAM 4; .5 G/20ML; G/20ML
3.38 INJECTION, POWDER, LYOPHILIZED, FOR SOLUTION INTRAVENOUS EVERY 8 HOURS
Refills: 0 | Status: DISCONTINUED | OUTPATIENT
Start: 2021-09-21 | End: 2021-09-21

## 2021-09-21 RX ORDER — OXYCODONE HYDROCHLORIDE 5 MG/1
5 TABLET ORAL EVERY 6 HOURS
Refills: 0 | Status: DISCONTINUED | OUTPATIENT
Start: 2021-09-21 | End: 2021-09-26

## 2021-09-21 RX ORDER — ACETAMINOPHEN 500 MG
650 TABLET ORAL EVERY 6 HOURS
Refills: 0 | Status: DISCONTINUED | OUTPATIENT
Start: 2021-09-21 | End: 2021-09-27

## 2021-09-21 RX ORDER — SODIUM CHLORIDE 9 MG/ML
1000 INJECTION INTRAMUSCULAR; INTRAVENOUS; SUBCUTANEOUS
Refills: 0 | Status: DISCONTINUED | OUTPATIENT
Start: 2021-09-21 | End: 2021-09-26

## 2021-09-21 RX ORDER — PIPERACILLIN AND TAZOBACTAM 4; .5 G/20ML; G/20ML
3.38 INJECTION, POWDER, LYOPHILIZED, FOR SOLUTION INTRAVENOUS ONCE
Refills: 0 | Status: DISCONTINUED | OUTPATIENT
Start: 2021-09-21 | End: 2021-09-21

## 2021-09-21 RX ORDER — SENNA PLUS 8.6 MG/1
2 TABLET ORAL AT BEDTIME
Refills: 0 | Status: DISCONTINUED | OUTPATIENT
Start: 2021-09-21 | End: 2021-09-27

## 2021-09-21 RX ORDER — VANCOMYCIN HCL 1 G
1000 VIAL (EA) INTRAVENOUS EVERY 12 HOURS
Refills: 0 | Status: DISCONTINUED | OUTPATIENT
Start: 2021-09-21 | End: 2021-09-22

## 2021-09-21 RX ADMIN — PIPERACILLIN AND TAZOBACTAM 200 GRAM(S): 4; .5 INJECTION, POWDER, LYOPHILIZED, FOR SOLUTION INTRAVENOUS at 17:34

## 2021-09-21 RX ADMIN — HEPARIN SODIUM 5000 UNIT(S): 5000 INJECTION INTRAVENOUS; SUBCUTANEOUS at 05:20

## 2021-09-21 RX ADMIN — HEPARIN SODIUM 5000 UNIT(S): 5000 INJECTION INTRAVENOUS; SUBCUTANEOUS at 15:12

## 2021-09-21 RX ADMIN — PIPERACILLIN AND TAZOBACTAM 200 GRAM(S): 4; .5 INJECTION, POWDER, LYOPHILIZED, FOR SOLUTION INTRAVENOUS at 11:53

## 2021-09-21 RX ADMIN — HEPARIN SODIUM 5000 UNIT(S): 5000 INJECTION INTRAVENOUS; SUBCUTANEOUS at 22:23

## 2021-09-21 RX ADMIN — OXYCODONE HYDROCHLORIDE 5 MILLIGRAM(S): 5 TABLET ORAL at 02:14

## 2021-09-21 RX ADMIN — OXYCODONE HYDROCHLORIDE 5 MILLIGRAM(S): 5 TABLET ORAL at 16:25

## 2021-09-21 RX ADMIN — OXYCODONE HYDROCHLORIDE 5 MILLIGRAM(S): 5 TABLET ORAL at 02:41

## 2021-09-21 RX ADMIN — PIPERACILLIN AND TAZOBACTAM 200 GRAM(S): 4; .5 INJECTION, POWDER, LYOPHILIZED, FOR SOLUTION INTRAVENOUS at 22:04

## 2021-09-21 RX ADMIN — Medication 250 MILLIGRAM(S): at 18:36

## 2021-09-21 RX ADMIN — Medication 250 MILLIGRAM(S): at 07:04

## 2021-09-21 RX ADMIN — SODIUM CHLORIDE 125 MILLILITER(S): 9 INJECTION INTRAMUSCULAR; INTRAVENOUS; SUBCUTANEOUS at 01:03

## 2021-09-21 RX ADMIN — OXYCODONE HYDROCHLORIDE 5 MILLIGRAM(S): 5 TABLET ORAL at 17:19

## 2021-09-21 RX ADMIN — PIPERACILLIN AND TAZOBACTAM 200 GRAM(S): 4; .5 INJECTION, POWDER, LYOPHILIZED, FOR SOLUTION INTRAVENOUS at 05:20

## 2021-09-21 NOTE — PROGRESS NOTE ADULT - SUBJECTIVE AND OBJECTIVE BOX
POST OP NOTE:  procedure: penile implant removal  Patient admits to pain in the scrotum and phallus. States he took pain medication recently and will see how he feels shortly. Denies fevers, chills, chest pain, nausea or vomiting.       09-20-21 @ 07:01  -  09-21-21 @ 02:28  --------------------------------------------------------  IN: 125 mL / OUT: 305 mL / NET: -180 mL      T(C): 36.8 (09-21-21 @ 00:50), Max: 39.6 (09-20-21 @ 21:15)  HR: 86 (09-21-21 @ 02:05) (69 - 121)  BP: 105/67 (09-21-21 @ 01:50) (93/55 - 149/98)  RR: 17 (09-21-21 @ 02:05) (11 - 22)  SpO2: 99% (09-21-21 @ 02:05) (94% - 100%)    GEN: No acute distress. alert and oriented x4  ABD: Soft, non-tender, non-distended   : stephenson in place draining clear, yellow urine             POST OP NOTE:  procedure: penile implant removal  Patient admits to pain in the scrotum and phallus. States he took pain medication recently and will see how he feels shortly. Denies fevers, chills, chest pain, nausea or vomiting.       09-20-21 @ 07:01  -  09-21-21 @ 02:28  --------------------------------------------------------  IN: 125 mL / OUT: 305 mL / NET: -180 mL      T(C): 36.8 (09-21-21 @ 00:50), Max: 39.6 (09-20-21 @ 21:15)  HR: 86 (09-21-21 @ 02:05) (69 - 121)  BP: 105/67 (09-21-21 @ 01:50) (93/55 - 149/98)  RR: 17 (09-21-21 @ 02:05) (11 - 22)  SpO2: 99% (09-21-21 @ 02:05) (94% - 100%)    GEN: No acute distress. alert and oriented x4  ABD: Soft, non-tender, non-distended   : stephenson in place draining clear, yellow urine. packing noted at ventral base of phallus and dorsal inferior scrotum. +bloody drainage from both incisions.

## 2021-09-21 NOTE — PROGRESS NOTE ADULT - ASSESSMENT
72yo male with PMH of HTN, prostate CA (s/p prostatectomy 2011), prior penile implants w/post-op infections presented to [facility] with fevers at home s/p penile prosthesis removal and replaced w/ a malleable prosthesis 9/10/21. Patient admitted and started on Vanc/Zosyn for full antibiotic coverage while cultures were pending and removal of prosthesis. While in ED holding patient became unresponsive and stuporous, rapid response was called. Tmax 103.6 rectally. Lactate peak 16. Patient stabilized and brought to OR for removal of prosthesis for infection source control on 09/20

## 2021-09-21 NOTE — PROGRESS NOTE ADULT - ASSESSMENT
72yo male with PMH of HTN,prostate CA (s/p prostatectomy 2011), prior penile implants w/post-op infections presented to [facility] with fevers at home s/p penile prosthesis removal and replaced w/ a malleable prosthesis 9/10/21. Patient admitted and started on Vanc/Zosyn for full antibiotic coverage while cultures were pending and removal of prosthesis. While in ED holding patient became unresponsive and stuporous, rapid response was called. Tmax 103.6 recatally. Patient brought to OR for removal of prosthesis for infection source control.  72yo male with PMH of HTN,prostate CA (s/p prostatectomy 2011), prior penile implants w/post-op infections presented to [facility] with fevers at home s/p penile prosthesis removal and replaced w/ a malleable prosthesis 9/10/21. Patient admitted and started on Vanc/Zosyn for full antibiotic coverage while cultures were pending and removal of prosthesis. While in ED holding patient became unresponsive and stuporous, rapid response was called. Tmax 103.6 rectally Lactate peak 16. Patient stablized and  brought to OR for removal of prosthesis for infection source control.

## 2021-09-21 NOTE — PROGRESS NOTE ADULT - SUBJECTIVE AND OBJECTIVE BOX
INTERVAL HPI/OVERNIGHT EVENTS:  No acute events overnight. Patient's pain has been moderately controlled since the OR.     VITALS:    T(F): 97.4 (21 @ 04:39), Max: 103.3 (21 @ 21:25)  HR: 80 (21 @ 03:00) (69 - 121)  BP: 127/78 (21 @ 03:00) (93/55 - 149/98)  RR: 18 (21 @ 03:00) (11 - 22)  SpO2: 98% (21 @ 03:00) (94% - 100%)  Wt(kg): --    I&O's Detail    20 Sep 2021 07:01  -  21 Sep 2021 04:56  --------------------------------------------------------  IN:    sodium chloride 0.9%: 125 mL  Total IN: 125 mL    OUT:    Blood Loss (mL): 5 mL    Indwelling Catheter - Urethral (mL): 300 mL  Total OUT: 305 mL    Total NET: -180 mL          MEDICATIONS:    ANTIBIOTICS:  piperacillin/tazobactam IVPB. 3.375 Gram(s) IV Intermittent once  piperacillin/tazobactam IVPB.. 3.375 Gram(s) IV Intermittent every 6 hours  vancomycin  IVPB 1000 milliGRAM(s) IV Intermittent every 12 hours      PAIN CONTROL:  acetaminophen   Tablet .. 650 milliGRAM(s) Oral every 6 hours PRN  oxyCODONE    IR 5 milliGRAM(s) Oral every 6 hours PRN       MEDS:      HEME/ONC  heparin   Injectable 5000 Unit(s) SubCutaneous every 8 hours        PHYSICAL EXAM:  General: No acute distress.  Alert and Oriented  Abdominal Exam: soft, non-tender, non-distended    Exam: stephenson in place draining clear, yellow urine. packing noted at ventral base of phallus and dorsal inferior scrotum. +bloody drainage from both incisions.         LABS:                        15.7   16.33 )-----------( 171      ( 20 Sep 2021 15:10 )             47.0         136  |  96  |  18  ----------------------------<  145<H>  3.8   |  31  |  1.09    Ca    9.2      20 Sep 2021 15:11    TPro  8.0  /  Alb  4.5  /  TBili  1.9<H>  /  DBili  x   /  AST  18  /  ALT  11  /  AlkPhos  71  09-20      Urinalysis Basic - ( 20 Sep 2021 21:19 )    Color: Yellow / Appearance: Clear / S.010 / pH: x  Gluc: x / Ketone: NEGATIVE  / Bili: Negative / Urobili: 0.2 E.U./dL   Blood: x / Protein: NEGATIVE mg/dL / Nitrite: POSITIVE   Leuk Esterase: NEGATIVE / RBC: < 5 /HPF / WBC < 5 /HPF   Sq Epi: x / Non Sq Epi: 0-5 /HPF / Bacteria: Present /HPF

## 2021-09-21 NOTE — PROGRESS NOTE ADULT - PROBLEM SELECTOR PLAN 1
- s/p removal of implant   - IVF  - diet: regular   - telemetry bed for further monitoring   - continue stephenson catheter for maximal bladder drainage   - OOB/IS  - SCDs

## 2021-09-21 NOTE — PROGRESS NOTE ADULT - PROBLEM SELECTOR PLAN 1
- started on Vanc/Zosyn   - s/p removal of implant   - IVF  - diet: regular   - telemetry bed for further monitoring   - continue stephenson catheter for maximal bladder drainage   - OOB/IS  - SCDs

## 2021-09-22 LAB
ANION GAP SERPL CALC-SCNC: 6 MMOL/L — SIGNIFICANT CHANGE UP (ref 5–17)
BUN SERPL-MCNC: 14 MG/DL — SIGNIFICANT CHANGE UP (ref 7–23)
CALCIUM SERPL-MCNC: 8.6 MG/DL — SIGNIFICANT CHANGE UP (ref 8.4–10.5)
CHLORIDE SERPL-SCNC: 107 MMOL/L — SIGNIFICANT CHANGE UP (ref 96–108)
CO2 SERPL-SCNC: 29 MMOL/L — SIGNIFICANT CHANGE UP (ref 22–31)
CREAT SERPL-MCNC: 1.15 MG/DL — SIGNIFICANT CHANGE UP (ref 0.5–1.3)
CULTURE RESULTS: NO GROWTH — SIGNIFICANT CHANGE UP
GLUCOSE SERPL-MCNC: 132 MG/DL — HIGH (ref 70–99)
HCT VFR BLD CALC: 38.3 % — LOW (ref 39–50)
HCT VFR BLD CALC: 39.3 % — SIGNIFICANT CHANGE UP (ref 39–50)
HGB BLD-MCNC: 12.4 G/DL — LOW (ref 13–17)
HGB BLD-MCNC: 12.5 G/DL — LOW (ref 13–17)
MAGNESIUM SERPL-MCNC: 2 MG/DL — SIGNIFICANT CHANGE UP (ref 1.6–2.6)
MCHC RBC-ENTMCNC: 31.8 GM/DL — LOW (ref 32–36)
MCHC RBC-ENTMCNC: 32.2 PG — SIGNIFICANT CHANGE UP (ref 27–34)
MCHC RBC-ENTMCNC: 32.4 GM/DL — SIGNIFICANT CHANGE UP (ref 32–36)
MCHC RBC-ENTMCNC: 32.7 PG — SIGNIFICANT CHANGE UP (ref 27–34)
MCV RBC AUTO: 101.1 FL — HIGH (ref 80–100)
MCV RBC AUTO: 101.3 FL — HIGH (ref 80–100)
NRBC # BLD: 0 /100 WBCS — SIGNIFICANT CHANGE UP (ref 0–0)
NRBC # BLD: 0 /100 WBCS — SIGNIFICANT CHANGE UP (ref 0–0)
PHOSPHATE SERPL-MCNC: 2.3 MG/DL — LOW (ref 2.5–4.5)
PLATELET # BLD AUTO: 150 K/UL — SIGNIFICANT CHANGE UP (ref 150–400)
PLATELET # BLD AUTO: 156 K/UL — SIGNIFICANT CHANGE UP (ref 150–400)
POTASSIUM SERPL-MCNC: 4.2 MMOL/L — SIGNIFICANT CHANGE UP (ref 3.5–5.3)
POTASSIUM SERPL-SCNC: 4.2 MMOL/L — SIGNIFICANT CHANGE UP (ref 3.5–5.3)
RBC # BLD: 3.79 M/UL — LOW (ref 4.2–5.8)
RBC # BLD: 3.88 M/UL — LOW (ref 4.2–5.8)
RBC # FLD: 12.3 % — SIGNIFICANT CHANGE UP (ref 10.3–14.5)
RBC # FLD: 12.3 % — SIGNIFICANT CHANGE UP (ref 10.3–14.5)
SODIUM SERPL-SCNC: 142 MMOL/L — SIGNIFICANT CHANGE UP (ref 135–145)
SPECIMEN SOURCE: SIGNIFICANT CHANGE UP
WBC # BLD: 8.29 K/UL — SIGNIFICANT CHANGE UP (ref 3.8–10.5)
WBC # BLD: 9.98 K/UL — SIGNIFICANT CHANGE UP (ref 3.8–10.5)
WBC # FLD AUTO: 8.29 K/UL — SIGNIFICANT CHANGE UP (ref 3.8–10.5)
WBC # FLD AUTO: 9.98 K/UL — SIGNIFICANT CHANGE UP (ref 3.8–10.5)

## 2021-09-22 PROCEDURE — 99222 1ST HOSP IP/OBS MODERATE 55: CPT

## 2021-09-22 RX ORDER — SODIUM,POTASSIUM PHOSPHATES 278-250MG
1 POWDER IN PACKET (EA) ORAL ONCE
Refills: 0 | Status: COMPLETED | OUTPATIENT
Start: 2021-09-22 | End: 2021-09-22

## 2021-09-22 RX ORDER — PIPERACILLIN AND TAZOBACTAM 4; .5 G/20ML; G/20ML
4.5 INJECTION, POWDER, LYOPHILIZED, FOR SOLUTION INTRAVENOUS EVERY 6 HOURS
Refills: 0 | Status: DISCONTINUED | OUTPATIENT
Start: 2021-09-22 | End: 2021-09-23

## 2021-09-22 RX ADMIN — PIPERACILLIN AND TAZOBACTAM 200 GRAM(S): 4; .5 INJECTION, POWDER, LYOPHILIZED, FOR SOLUTION INTRAVENOUS at 05:33

## 2021-09-22 RX ADMIN — HEPARIN SODIUM 5000 UNIT(S): 5000 INJECTION INTRAVENOUS; SUBCUTANEOUS at 14:52

## 2021-09-22 RX ADMIN — HEPARIN SODIUM 5000 UNIT(S): 5000 INJECTION INTRAVENOUS; SUBCUTANEOUS at 22:20

## 2021-09-22 RX ADMIN — Medication 1 PACKET(S): at 19:30

## 2021-09-22 RX ADMIN — Medication 250 MILLIGRAM(S): at 06:32

## 2021-09-22 RX ADMIN — HEPARIN SODIUM 5000 UNIT(S): 5000 INJECTION INTRAVENOUS; SUBCUTANEOUS at 05:34

## 2021-09-22 RX ADMIN — PIPERACILLIN AND TAZOBACTAM 200 GRAM(S): 4; .5 INJECTION, POWDER, LYOPHILIZED, FOR SOLUTION INTRAVENOUS at 18:19

## 2021-09-22 RX ADMIN — PIPERACILLIN AND TAZOBACTAM 200 GRAM(S): 4; .5 INJECTION, POWDER, LYOPHILIZED, FOR SOLUTION INTRAVENOUS at 11:40

## 2021-09-22 NOTE — CONSULT NOTE ADULT - SUBJECTIVE AND OBJECTIVE BOX
INFECTIOUS DISEASES INITIAL CONSULT NOTE    HPI:  71M h/o prostate ca (s/p prostatectomy 2011), prior IPP infection s/p reimplantation on 9/10/21 by Dr. Park p/w infected IPP.  He had initial IPP many years ago without any issue, then he had infection of one side about a year ago which was removed and treated with PO abx.  He had IPP removed then replaced on 9/10/21 without any issue.  Around 9/19 he spiked a fever of 101 at home with discharge from incisional site. He called Dr. Park and saw him in the office, culture collected, and he was instructed to go to the ED>  He was febrile to 18.1, leukocytosis of 16.1.  Vanco/zsyn started.  He underwent IPP removal on 9/21.  BCx neg, UCx neg, OR culture growing PsA, aeromonas hydrophia and proteus vulgaris.   ID was consulted for abx rec.      PAST MEDICAL & SURGICAL HISTORY:  Rotator cuff disorder    Hypertension    Prostate cancer  s/p prostatectomy, denies chemo and radiation    S/P appendectomy  18y/o    S/P prostatectomy  2011    History of penile implant          Review of Systems:   Constitutional, eyes, ENT, cardiovascular, respiratory, gastrointestinal, genitourinary, integumentary, neurological, psychiatric and heme/lymph are otherwise negative other than noted above       ANTIBIOTICS:  MEDICATIONS  (STANDING):  heparin   Injectable 5000 Unit(s) SubCutaneous every 8 hours  piperacillin/tazobactam IVPB.. 4.5 Gram(s) IV Intermittent every 6 hours  senna 2 Tablet(s) Oral at bedtime  sodium chloride 0.9%. 1000 milliLiter(s) (125 mL/Hr) IV Continuous <Continuous>    MEDICATIONS  (PRN):  acetaminophen   Tablet .. 650 milliGRAM(s) Oral every 6 hours PRN Moderate Pain (4 - 6)  oxyCODONE    IR 5 milliGRAM(s) Oral every 6 hours PRN Severe Pain (7 - 10)      Allergies    No Known Allergies    Intolerances        SOCIAL HISTORY:    FAMILY HISTORY:  Family history of diabetes mellitus (Mother)     no FH leading to current infection    Vital Signs Last 24 Hrs  T(C): 37.1 (22 Sep 2021 18:07), Max: 37.1 (22 Sep 2021 18:07)  T(F): 98.7 (22 Sep 2021 18:07), Max: 98.7 (22 Sep 2021 18:07)  HR: 76 (22 Sep 2021 20:00) (74 - 86)  BP: 120/73 (22 Sep 2021 20:00) (94/60 - 120/73)  BP(mean): 92 (22 Sep 2021 20:00) (72 - 92)  RR: 18 (22 Sep 2021 20:00) (17 - 18)  SpO2: 96% (22 Sep 2021 20:00) (94% - 97%)    09-21-21 @ 07:01  -  09-22-21 @ 07:00  --------------------------------------------------------  IN: 3905 mL / OUT: 4525 mL / NET: -620 mL    09-22-21 @ 07:01  -  09-22-21 @ 21:40  --------------------------------------------------------  IN: 1825 mL / OUT: 2900 mL / NET: -1075 mL        PHYSICAL EXAM:  Constitutional: alert, NAD  Eyes: the sclera and conjunctiva were normal.   ENT: the ears and nose were normal in appearance.   Neck: the appearance of the neck was normal and the neck was supple.   Pulmonary: no respiratory distress and lungs were clear to auscultation bilaterally.   Heart: heart rate was normal and rhythm regular, normal S1 and S2  : L scrotal surgical wound with packing, mild erythema, ttp   Abdomen: normal bowel sounds, soft, non-tender  Neurological: no focal deficits.   Psychiatric: the affect was normal      LABS:                        12.4   8.29  )-----------( 156      ( 22 Sep 2021 15:03 )             38.3     09-22    142  |  107  |  14  ----------------------------<  132<H>  4.2   |  29  |  1.15    Ca    8.6      22 Sep 2021 07:15  Phos  2.3     09-22  Mg     2.0     09-22            MICROBIOLOGY:  9/21 Abscess perineal WCx OR: PsA, aeromonas hydrophila, P. vulgaris  9/20: UCx neg  9/20 BCx ngtd x 2      RADIOLOGY & ADDITIONAL STUDIES:

## 2021-09-22 NOTE — PROGRESS NOTE ADULT - SUBJECTIVE AND OBJECTIVE BOX
INTERVAL HPI/OVERNIGHT EVENTS:  No acute events overnight.    VITALS:    T(F): 98.5 (21 @ 04:56), Max: 98.9 (21 @ 21:36)  HR: 74 (21 @ 04:25) (74 - 86)  BP: 112/75 (21 @ 04:25) (97/60 - 112/75)  RR: 18 (21 @ 04:25) (17 - 18)  SpO2: 96% (21 @ 04:25) (94% - 97%)  Wt(kg): --    I&O's Detail    20 Sep 2021 07:01  -  21 Sep 2021 07:00  --------------------------------------------------------  IN:    sodium chloride 0.9%: 750 mL  Total IN: 750 mL    OUT:    Blood Loss (mL): 5 mL    Indwelling Catheter - Urethral (mL): 1300 mL  Total OUT: 1305 mL    Total NET: -555 mL      21 Sep 2021 07:01  -  22 Sep 2021 05:59  --------------------------------------------------------  IN:    IV PiggyBack: 450 mL    Oral Fluid: 480 mL    sodium chloride 0.9%: 2750 mL  Total IN: 3680 mL    OUT:    Indwelling Catheter - Urethral (mL): 4525 mL  Total OUT: 4525 mL    Total NET: -845 mL          MEDICATIONS:    ANTIBIOTICS:  piperacillin/tazobactam IVPB.. 3.375 Gram(s) IV Intermittent every 6 hours  vancomycin  IVPB 1000 milliGRAM(s) IV Intermittent every 12 hours      PAIN CONTROL:  acetaminophen   Tablet .. 650 milliGRAM(s) Oral every 6 hours PRN  oxyCODONE    IR 5 milliGRAM(s) Oral every 6 hours PRN       MEDS:      HEME/ONC  heparin   Injectable 5000 Unit(s) SubCutaneous every 8 hours        PHYSICAL EXAM:  General: No acute distress.  Alert and Oriented  Abdominal Exam: Soft nt/nd.   Exam: Berkowitz catheter in place draining yellow/clear. Incision site c/d/i, dressing in place.       LABS:                        15.7   16.33 )-----------( 171      ( 20 Sep 2021 15:10 )             47.0         136  |  96  |  18  ----------------------------<  145<H>  3.8   |  31  |  1.09    Ca    9.2      20 Sep 2021 15:11    TPro  8.0  /  Alb  4.5  /  TBili  1.9<H>  /  DBili  x   /  AST  18  /  ALT  11  /  AlkPhos  71        Urinalysis Basic - ( 20 Sep 2021 21:19 )    Color: Yellow / Appearance: Clear / S.010 / pH: x  Gluc: x / Ketone: NEGATIVE  / Bili: Negative / Urobili: 0.2 E.U./dL   Blood: x / Protein: NEGATIVE mg/dL / Nitrite: POSITIVE   Leuk Esterase: NEGATIVE / RBC: < 5 /HPF / WBC < 5 /HPF   Sq Epi: x / Non Sq Epi: 0-5 /HPF / Bacteria: Present /HPF        RADIOLOGY & ADDITIONAL TESTS:    ASSESSMENT: 71yMale w/ infected malleable penile prosthesis s/p implant removal. Afebrile overnight.     PLAN:  Diet: Reg  Pain control  Monitor Urine Output  DVT ppx  Cont Abx: Vanc, Zosyn  OOB/IS

## 2021-09-22 NOTE — CONSULT NOTE ADULT - ASSESSMENT
71M h/o prostate ca (s/p prostatectomy 2011), prior IPP infection s/p reimplantation on 9/10/21 by Dr. Park p/w infected IPP, now s/p removal of IPP on 9/21.  OR culture growing PsA, aeromonas hydrophila, and Proteus vulgaris.    - stop vanco  - increase zosyn to 4.5g IV q6h (anti-PsA dosage)  - f/u OR culture for susceptibility    Team 2 will follow you.  Case d/w primary team.    Meche Long MD, MS  Infectious Disease attending  work cell [phone]   For any questions during evening/weekend/holiday, please page ID on call

## 2021-09-23 ENCOUNTER — TRANSCRIPTION ENCOUNTER (OUTPATIENT)
Age: 71
End: 2021-09-23

## 2021-09-23 LAB
-  AMIKACIN: SIGNIFICANT CHANGE UP
-  AMPICILLIN/SULBACTAM: SIGNIFICANT CHANGE UP
-  AMPICILLIN/SULBACTAM: SIGNIFICANT CHANGE UP
-  AMPICILLIN: SIGNIFICANT CHANGE UP
-  AZTREONAM: SIGNIFICANT CHANGE UP
-  AZTREONAM: SIGNIFICANT CHANGE UP
-  CEFAZOLIN: SIGNIFICANT CHANGE UP
-  CEFAZOLIN: SIGNIFICANT CHANGE UP
-  CEFEPIME: SIGNIFICANT CHANGE UP
-  CEFTAZIDIME: SIGNIFICANT CHANGE UP
-  CEFTRIAXONE: SIGNIFICANT CHANGE UP
-  CEFTRIAXONE: SIGNIFICANT CHANGE UP
-  CIPROFLOXACIN: SIGNIFICANT CHANGE UP
-  ERTAPENEM: SIGNIFICANT CHANGE UP
-  GENTAMICIN: SIGNIFICANT CHANGE UP
-  MEROPENEM: SIGNIFICANT CHANGE UP
-  PIPERACILLIN/TAZOBACTAM: SIGNIFICANT CHANGE UP
-  TOBRAMYCIN: SIGNIFICANT CHANGE UP
-  TOBRAMYCIN: SIGNIFICANT CHANGE UP
-  TRIMETHOPRIM/SULFAMETHOXAZOLE: SIGNIFICANT CHANGE UP
-  TRIMETHOPRIM/SULFAMETHOXAZOLE: SIGNIFICANT CHANGE UP
ANION GAP SERPL CALC-SCNC: 7 MMOL/L — SIGNIFICANT CHANGE UP (ref 5–17)
BUN SERPL-MCNC: 15 MG/DL — SIGNIFICANT CHANGE UP (ref 7–23)
CALCIUM SERPL-MCNC: 8.2 MG/DL — LOW (ref 8.4–10.5)
CHLORIDE SERPL-SCNC: 108 MMOL/L — SIGNIFICANT CHANGE UP (ref 96–108)
CO2 SERPL-SCNC: 27 MMOL/L — SIGNIFICANT CHANGE UP (ref 22–31)
CREAT SERPL-MCNC: 1.17 MG/DL — SIGNIFICANT CHANGE UP (ref 0.5–1.3)
CULTURE RESULTS: SIGNIFICANT CHANGE UP
GLUCOSE SERPL-MCNC: 98 MG/DL — SIGNIFICANT CHANGE UP (ref 70–99)
HCT VFR BLD CALC: 35.8 % — LOW (ref 39–50)
HGB BLD-MCNC: 11.6 G/DL — LOW (ref 13–17)
MAGNESIUM SERPL-MCNC: 1.9 MG/DL — SIGNIFICANT CHANGE UP (ref 1.6–2.6)
MCHC RBC-ENTMCNC: 32.4 GM/DL — SIGNIFICANT CHANGE UP (ref 32–36)
MCHC RBC-ENTMCNC: 32.8 PG — SIGNIFICANT CHANGE UP (ref 27–34)
MCV RBC AUTO: 101.1 FL — HIGH (ref 80–100)
METHOD TYPE: SIGNIFICANT CHANGE UP
NRBC # BLD: 0 /100 WBCS — SIGNIFICANT CHANGE UP (ref 0–0)
ORGANISM # SPEC MICROSCOPIC CNT: SIGNIFICANT CHANGE UP
PHOSPHATE SERPL-MCNC: 3.9 MG/DL — SIGNIFICANT CHANGE UP (ref 2.5–4.5)
PLATELET # BLD AUTO: 150 K/UL — SIGNIFICANT CHANGE UP (ref 150–400)
POTASSIUM SERPL-MCNC: 3.8 MMOL/L — SIGNIFICANT CHANGE UP (ref 3.5–5.3)
POTASSIUM SERPL-SCNC: 3.8 MMOL/L — SIGNIFICANT CHANGE UP (ref 3.5–5.3)
RBC # BLD: 3.54 M/UL — LOW (ref 4.2–5.8)
RBC # FLD: 12.1 % — SIGNIFICANT CHANGE UP (ref 10.3–14.5)
SODIUM SERPL-SCNC: 142 MMOL/L — SIGNIFICANT CHANGE UP (ref 135–145)
SPECIMEN SOURCE: SIGNIFICANT CHANGE UP
WBC # BLD: 5.83 K/UL — SIGNIFICANT CHANGE UP (ref 3.8–10.5)
WBC # FLD AUTO: 5.83 K/UL — SIGNIFICANT CHANGE UP (ref 3.8–10.5)

## 2021-09-23 PROCEDURE — 99232 SBSQ HOSP IP/OBS MODERATE 35: CPT

## 2021-09-23 RX ORDER — INFLUENZA VIRUS VACCINE 15; 15; 15; 15 UG/.5ML; UG/.5ML; UG/.5ML; UG/.5ML
0.7 SUSPENSION INTRAMUSCULAR ONCE
Refills: 0 | Status: DISCONTINUED | OUTPATIENT
Start: 2021-09-23 | End: 2021-09-27

## 2021-09-23 RX ORDER — CEFEPIME 1 G/1
2000 INJECTION, POWDER, FOR SOLUTION INTRAMUSCULAR; INTRAVENOUS EVERY 8 HOURS
Refills: 0 | Status: DISCONTINUED | OUTPATIENT
Start: 2021-09-23 | End: 2021-09-27

## 2021-09-23 RX ORDER — INFLUENZA VIRUS VACCINE 15; 15; 15; 15 UG/.5ML; UG/.5ML; UG/.5ML; UG/.5ML
0.5 SUSPENSION INTRAMUSCULAR ONCE
Refills: 0 | Status: DISCONTINUED | OUTPATIENT
Start: 2021-09-23 | End: 2021-09-23

## 2021-09-23 RX ADMIN — PIPERACILLIN AND TAZOBACTAM 200 GRAM(S): 4; .5 INJECTION, POWDER, LYOPHILIZED, FOR SOLUTION INTRAVENOUS at 06:18

## 2021-09-23 RX ADMIN — HEPARIN SODIUM 5000 UNIT(S): 5000 INJECTION INTRAVENOUS; SUBCUTANEOUS at 14:05

## 2021-09-23 RX ADMIN — PIPERACILLIN AND TAZOBACTAM 200 GRAM(S): 4; .5 INJECTION, POWDER, LYOPHILIZED, FOR SOLUTION INTRAVENOUS at 00:13

## 2021-09-23 RX ADMIN — SODIUM CHLORIDE 125 MILLILITER(S): 9 INJECTION INTRAMUSCULAR; INTRAVENOUS; SUBCUTANEOUS at 14:19

## 2021-09-23 RX ADMIN — HEPARIN SODIUM 5000 UNIT(S): 5000 INJECTION INTRAVENOUS; SUBCUTANEOUS at 22:03

## 2021-09-23 RX ADMIN — PIPERACILLIN AND TAZOBACTAM 200 GRAM(S): 4; .5 INJECTION, POWDER, LYOPHILIZED, FOR SOLUTION INTRAVENOUS at 11:42

## 2021-09-23 RX ADMIN — SODIUM CHLORIDE 125 MILLILITER(S): 9 INJECTION INTRAMUSCULAR; INTRAVENOUS; SUBCUTANEOUS at 05:36

## 2021-09-23 RX ADMIN — HEPARIN SODIUM 5000 UNIT(S): 5000 INJECTION INTRAVENOUS; SUBCUTANEOUS at 06:18

## 2021-09-23 RX ADMIN — CEFEPIME 100 MILLIGRAM(S): 1 INJECTION, POWDER, FOR SOLUTION INTRAMUSCULAR; INTRAVENOUS at 14:05

## 2021-09-23 RX ADMIN — CEFEPIME 100 MILLIGRAM(S): 1 INJECTION, POWDER, FOR SOLUTION INTRAMUSCULAR; INTRAVENOUS at 22:03

## 2021-09-23 NOTE — DISCHARGE NOTE PROVIDER - NSDCCPCAREPLAN_GEN_ALL_CORE_FT
PRINCIPAL DISCHARGE DIAGNOSIS  Diagnosis: Complication of implanted penile prosthesis  Assessment and Plan of Treatment:       SECONDARY DISCHARGE DIAGNOSES  Diagnosis: Prostate cancer  Assessment and Plan of Treatment:     Diagnosis: Hypertension  Assessment and Plan of Treatment:

## 2021-09-23 NOTE — DIETITIAN INITIAL EVALUATION ADULT. - OTHER CALCULATIONS
ABW used for calculations as pt between % of IBW (102%). Nutrient needs based on [facility] standards of care for maintenance in older adults. Needs adjusted for infection and age.

## 2021-09-23 NOTE — DISCHARGE NOTE PROVIDER - HOSPITAL COURSE
71M h/o prostate ca (s/p prostatectomy 2011), HTN, prior IPP infection s/p reimplantation on 9/10/21 by Dr. Park p/w infected IPP, now s/p removal of IPP on 9/21.  OR culture grew PsA, aeromonas hydrophila, and Proteus vulgaris.  Patient afebrile, WBC normal, surgical wound looks good, ambulating.  Per ID, It is reasonable to discharge him with oral Cipro to cover all three organisms.  Pt is hemodynamically stable and optimized for discharge.

## 2021-09-23 NOTE — DISCHARGE NOTE PROVIDER - NSDCCPTREATMENT_GEN_ALL_CORE_FT
PRINCIPAL PROCEDURE  Procedure: Removal of malleable penile prosthesis  Findings and Treatment:

## 2021-09-23 NOTE — DISCHARGE NOTE PROVIDER - NSDCFUADDINST_GEN_ALL_CORE_FT
WOUND CARE INSTRUCTIONS:  wound under scrotum: needs 4x4 gauze wet to dry dressing  wound under base of penis: needs dry packing WOUND CARE INSTRUCTIONS:  wound under scrotum: needs 4x4 gauze wet to dry dressing DAILY  wound under base of penis: needs dry packing DAILY WOUND CARE INSTRUCTIONS:  wound under scrotum: needs 4x4 gauze wet to dry dressing DAILY  wound under base of penis: needs dry packing DAILY    General Discharge Instructions:  Please resume all regular home medications unless specifically advised not to take a particular medication. Also, please take any new medications as prescribed.  Please get plenty of rest, continue to ambulate several times per day, and drink adequate amounts of fluids. Avoid lifting weights greater than 5-10 lbs until you follow-up with your surgeon, who will instruct you further regarding activity restrictions.  Avoid driving or operating heavy machinery while taking pain medications.    Incision Care:  *Please call your doctor or nurse practitioner if you have increased pain, swelling, redness, or drainage from the incision site.  *Avoid swimming and baths until your follow-up appointment.  *You may shower, and wash surgical incisions with a mild soap and warm water. Gently pat the area dry.      Warning Signs:  Please call your doctor or nurse practitioner if you experience the following:  *You experience new chest pain, pressure, squeezing or tightness.  *New or worsening cough, shortness of breath, or wheeze.  *If you are vomiting and cannot keep down fluids or your medications.  *You are getting dehydrated due to continued vomiting, diarrhea, or other reasons. Signs of dehydration include dry mouth, rapid heartbeat, or feeling dizzy or faint when standing.  *You see blood or dark/black material when you vomit or have a bowel movement.  *You experience burning when you urinate, have blood in your urine, or experience a discharge.  *Your pain is not improving within 8-12 hours or is not gone within 24 hours. Call or return immediately if your pain is getting worse, changes location, or moves to your chest or back.  *You have shaking chills, or fever greater than 101.5 degrees Fahrenheit or 38 degrees Celsius.  *Any change in your symptoms, or any new symptoms that concern you.

## 2021-09-23 NOTE — DIETITIAN INITIAL EVALUATION ADULT. - OTHER INFO
71yMale w/ infected malleable prosthesis s/p removal of prosthesis.     Pt seen in room, resting in bed. Currently on a regular diet and tolerating PO. Consuming 50-75% of meals per pt and RN. Denies GI distress, no N/V, last BM yesterday 9/22. Endorsing good appetite prior to admission. NKFA or dietary restrictions. Denies weight changes PTA. Understanding of nutrition services for future questions/concerns. RD to follow.

## 2021-09-23 NOTE — DISCHARGE NOTE PROVIDER - NSDCCPGOAL_GEN_ALL_CORE_FT
To get better and follow your care plan as instructed. To get better and follow your care plan as instructed. Ambulation, hydration, and return to activity of daily living.

## 2021-09-23 NOTE — PROGRESS NOTE ADULT - SUBJECTIVE AND OBJECTIVE BOX
INFECTIOUS DISEASES CONSULT FOLLOW-UP NOTE    INTERVAL HPI/OVERNIGHT EVENTS:  No event overnight  patient feels better  denied dysuria, fever,   pain improving at surgical site     ROS:   Constitutional, eyes, ENT, cardiovascular, respiratory, gastrointestinal, genitourinary, integumentary, neurological, psychiatric and heme/lymph are otherwise negative other than noted above       ANTIBIOTICS/RELEVANT:    MEDICATIONS  (STANDING):  cefepime   IVPB 2000 milliGRAM(s) IV Intermittent every 8 hours  heparin   Injectable 5000 Unit(s) SubCutaneous every 8 hours  senna 2 Tablet(s) Oral at bedtime  sodium chloride 0.9%. 1000 milliLiter(s) (125 mL/Hr) IV Continuous <Continuous>    MEDICATIONS  (PRN):  acetaminophen   Tablet .. 650 milliGRAM(s) Oral every 6 hours PRN Moderate Pain (4 - 6)  oxyCODONE    IR 5 milliGRAM(s) Oral every 6 hours PRN Severe Pain (7 - 10)        Vital Signs Last 24 Hrs  T(C): 37.1 (23 Sep 2021 09:33), Max: 37.1 (22 Sep 2021 18:07)  T(F): 98.7 (23 Sep 2021 09:33), Max: 98.7 (22 Sep 2021 18:07)  HR: 76 (23 Sep 2021 12:08) (72 - 78)  BP: 137/86 (23 Sep 2021 12:08) (116/78 - 142/94)  BP(mean): 107 (23 Sep 2021 12:08) (92 - 113)  RR: 18 (23 Sep 2021 12:08) (18 - 18)  SpO2: 96% (23 Sep 2021 12:08) (94% - 97%)    09-22-21 @ 07:01  -  09-23-21 @ 07:00  --------------------------------------------------------  IN: 3275 mL / OUT: 4650 mL / NET: -1375 mL    09-23-21 @ 07:01  -  09-23-21 @ 14:23  --------------------------------------------------------  IN: 1045 mL / OUT: 1075 mL / NET: -30 mL      PHYSICAL EXAM:  Constitutional: alert, NAD  Eyes: the sclera and conjunctiva were normal.   ENT: the ears and nose were normal in appearance.   Neck: the appearance of the neck was normal and the neck was supple.   Pulmonary: no respiratory distress   Abdomen: normal bowel sounds, soft, non-tender  : L scrotum with packing, mild erythema, no ttp   Neurological: no focal deficits.   Psychiatric: the affect was normal        LABS:                        11.6   5.83  )-----------( 150      ( 23 Sep 2021 06:44 )             35.8     09-23    142  |  108  |  15  ----------------------------<  98  3.8   |  27  |  1.17    Ca    8.2<L>      23 Sep 2021 06:44  Phos  3.9     09-23  Mg     1.9     09-23      EKG QTc 434      MICROBIOLOGY:  9/21 Abscess perineal WCx OR: PsA (pan S), aeromonas hydrophila (S to cefe, cipro, bact), P. vulgaris (S to cefe, bactrim, cipro)  9/20: UCx neg  9/20 BCx ngtd x 2      RADIOLOGY & ADDITIONAL STUDIES:  Reviewed

## 2021-09-23 NOTE — PROGRESS NOTE ADULT - ASSESSMENT
71M h/o prostate ca (s/p prostatectomy 2011), prior IPP infection s/p reimplantation on 9/10/21 by Dr. Park p/w infected IPP, now s/p removal of IPP on 9/21.  OR culture grew PsA, aeromonas hydrophila, and Proteus vulgaris.  Patient afebrile, WBC normal, surgical wound looks good, ambulating.  It is reasonable to discharge him with PO Cipro to cover all three organisms.       - give cefepime 2g IV q8h while in-house  - when ready to be discharged, discharge him with Cipro 750mg PO q12h x 7 day course (end 9/29)    Thank you for your consult.  Please re-consult us or call us with questions.  Case d/w primary team.    Meche Long MD, MS  Infectious Disease attending  work cell [phone]  For any questions during evening/weekend/holiday, please page ID on call

## 2021-09-23 NOTE — DISCHARGE NOTE PROVIDER - NSDCMRMEDTOKEN_GEN_ALL_CORE_FT
cephalexin 250 mg oral tablet: 3 tablets BID  losartan-hydrochlorothiazide 50mg-12.5mg oral tablet: 1 tab(s) orally once a day   cephalexin 250 mg oral tablet: 3 tablets BID  ciprofloxacin 750 mg oral tablet: 1 tab(s) orally every 12 hours   losartan-hydrochlorothiazide 50mg-12.5mg oral tablet: 1 tab(s) orally once a day

## 2021-09-23 NOTE — DIETITIAN INITIAL EVALUATION ADULT. - PERTINENT MEDS FT
71yMale w/ infected malleable prosthesis s/p removal of prosthesis.     Pt seen in room, resting in bed. Currently on a regular diet and tolerating PO. Consuming 50-75% of meals per pt and RN. Denies GI distress, no N/V, last BM yesterday 9/22. Endorsing good appetite prior to admission. NKFA or dietary restrictions. Denies weight changes PTA. Understanding of nutrition services for future questions/concersn. RD to follow.

## 2021-09-23 NOTE — DISCHARGE NOTE PROVIDER - CARE PROVIDER_API CALL
Rene Park)  Urology  [address] [place] [address]  Phone: [phone]  Fax: [phone]  Follow Up Time:

## 2021-09-23 NOTE — PROGRESS NOTE ADULT - SUBJECTIVE AND OBJECTIVE BOX
INTERVAL HPI/OVERNIGHT EVENTS:  No acute events overnight.    VITALS:    T(F): 98.2 (09-23-21 @ 04:44), Max: 98.7 (09-22-21 @ 18:07)  HR: 72 (09-23-21 @ 04:21) (72 - 84)  BP: 142/94 (09-23-21 @ 04:21) (94/60 - 142/94)  RR: 18 (09-23-21 @ 04:21) (17 - 18)  SpO2: 96% (09-23-21 @ 04:21) (94% - 97%)  Wt(kg): --    I&O's Detail    21 Sep 2021 07:01  -  22 Sep 2021 07:00  --------------------------------------------------------  IN:    IV PiggyBack: 675 mL    Oral Fluid: 480 mL    sodium chloride 0.9%: 2750 mL  Total IN: 3905 mL    OUT:    Indwelling Catheter - Urethral (mL): 4525 mL  Total OUT: 4525 mL    Total NET: -620 mL      22 Sep 2021 07:01  -  23 Sep 2021 05:47  --------------------------------------------------------  IN:    IV PiggyBack: 300 mL    sodium chloride 0.9%: 2875 mL  Total IN: 3175 mL    OUT:    Indwelling Catheter - Urethral (mL): 4350 mL  Total OUT: 4350 mL    Total NET: -1175 mL          MEDICATIONS:    ANTIBIOTICS:  piperacillin/tazobactam IVPB.. 4.5 Gram(s) IV Intermittent every 6 hours      PAIN CONTROL:  acetaminophen   Tablet .. 650 milliGRAM(s) Oral every 6 hours PRN  oxyCODONE    IR 5 milliGRAM(s) Oral every 6 hours PRN       MEDS:      HEME/ONC  heparin   Injectable 5000 Unit(s) SubCutaneous every 8 hours        PHYSICAL EXAM:  General: No acute distress.  Alert and Oriented  Abdominal Exam: soft nt/nd.   Exam: Dressing noted. Incisions appear c/d/i.       LABS:                        12.4   8.29  )-----------( 156      ( 22 Sep 2021 15:03 )             38.3     09-22    142  |  107  |  14  ----------------------------<  132<H>  4.2   |  29  |  1.15    Ca    8.6      22 Sep 2021 07:15  Phos  2.3     09-22  Mg     2.0     09-22            RADIOLOGY & ADDITIONAL TESTS:    ASSESSMENT: 71yMale w/ infected malleable prosthesis s/p removal of prosthesis.     PLAN:  Diet: Reg  Pain control  Monitor Urine Output  DVT ppx: SCD  Cont Abx: Zosyn  OOB/IS

## 2021-09-24 LAB
ANION GAP SERPL CALC-SCNC: 7 MMOL/L — SIGNIFICANT CHANGE UP (ref 5–17)
BUN SERPL-MCNC: 11 MG/DL — SIGNIFICANT CHANGE UP (ref 7–23)
CALCIUM SERPL-MCNC: 8.6 MG/DL — SIGNIFICANT CHANGE UP (ref 8.4–10.5)
CHLORIDE SERPL-SCNC: 106 MMOL/L — SIGNIFICANT CHANGE UP (ref 96–108)
CO2 SERPL-SCNC: 27 MMOL/L — SIGNIFICANT CHANGE UP (ref 22–31)
CREAT SERPL-MCNC: 0.96 MG/DL — SIGNIFICANT CHANGE UP (ref 0.5–1.3)
GLUCOSE SERPL-MCNC: 101 MG/DL — HIGH (ref 70–99)
HCT VFR BLD CALC: 37.2 % — LOW (ref 39–50)
HGB BLD-MCNC: 12.2 G/DL — LOW (ref 13–17)
MAGNESIUM SERPL-MCNC: 1.9 MG/DL — SIGNIFICANT CHANGE UP (ref 1.6–2.6)
MCHC RBC-ENTMCNC: 32.3 PG — SIGNIFICANT CHANGE UP (ref 27–34)
MCHC RBC-ENTMCNC: 32.8 GM/DL — SIGNIFICANT CHANGE UP (ref 32–36)
MCV RBC AUTO: 98.4 FL — SIGNIFICANT CHANGE UP (ref 80–100)
NRBC # BLD: 0 /100 WBCS — SIGNIFICANT CHANGE UP (ref 0–0)
PHOSPHATE SERPL-MCNC: 3.8 MG/DL — SIGNIFICANT CHANGE UP (ref 2.5–4.5)
PLATELET # BLD AUTO: 161 K/UL — SIGNIFICANT CHANGE UP (ref 150–400)
POTASSIUM SERPL-MCNC: 3.9 MMOL/L — SIGNIFICANT CHANGE UP (ref 3.5–5.3)
POTASSIUM SERPL-SCNC: 3.9 MMOL/L — SIGNIFICANT CHANGE UP (ref 3.5–5.3)
RBC # BLD: 3.78 M/UL — LOW (ref 4.2–5.8)
RBC # FLD: 11.9 % — SIGNIFICANT CHANGE UP (ref 10.3–14.5)
SODIUM SERPL-SCNC: 140 MMOL/L — SIGNIFICANT CHANGE UP (ref 135–145)
WBC # BLD: 6.12 K/UL — SIGNIFICANT CHANGE UP (ref 3.8–10.5)
WBC # FLD AUTO: 6.12 K/UL — SIGNIFICANT CHANGE UP (ref 3.8–10.5)

## 2021-09-24 RX ADMIN — CEFEPIME 100 MILLIGRAM(S): 1 INJECTION, POWDER, FOR SOLUTION INTRAMUSCULAR; INTRAVENOUS at 21:59

## 2021-09-24 RX ADMIN — CEFEPIME 100 MILLIGRAM(S): 1 INJECTION, POWDER, FOR SOLUTION INTRAMUSCULAR; INTRAVENOUS at 06:14

## 2021-09-24 RX ADMIN — CEFEPIME 100 MILLIGRAM(S): 1 INJECTION, POWDER, FOR SOLUTION INTRAMUSCULAR; INTRAVENOUS at 16:19

## 2021-09-24 RX ADMIN — HEPARIN SODIUM 5000 UNIT(S): 5000 INJECTION INTRAVENOUS; SUBCUTANEOUS at 06:14

## 2021-09-24 RX ADMIN — HEPARIN SODIUM 5000 UNIT(S): 5000 INJECTION INTRAVENOUS; SUBCUTANEOUS at 16:19

## 2021-09-24 RX ADMIN — SENNA PLUS 2 TABLET(S): 8.6 TABLET ORAL at 21:59

## 2021-09-24 RX ADMIN — HEPARIN SODIUM 5000 UNIT(S): 5000 INJECTION INTRAVENOUS; SUBCUTANEOUS at 22:08

## 2021-09-24 NOTE — PROVIDER CONTACT NOTE (OTHER) - ACTION/TREATMENT ORDERED:
MD notified, will continue to monitor.
Per Dr. Rangel, she will come speak with pt and wife and explain the plan to observe patient until Monday as per her attending' s orders. NEYMAR Costello endorsed information to pt and wife.

## 2021-09-24 NOTE — PROGRESS NOTE ADULT - SUBJECTIVE AND OBJECTIVE BOX
INTERVAL HPI/OVERNIGHT EVENTS:  No acute events overnight.    VITALS:    T(F): 98.4 (09-24-21 @ 04:47), Max: 98.7 (09-23-21 @ 09:33)  HR: 68 (09-24-21 @ 04:15) (68 - 80)  BP: 155/90 (09-24-21 @ 04:15) (131/86 - 155/90)  RR: 17 (09-24-21 @ 04:15) (17 - 18)  SpO2: 97% (09-24-21 @ 04:15) (95% - 97%)  Wt(kg): --    I&O's Detail    22 Sep 2021 07:01  -  23 Sep 2021 07:00  --------------------------------------------------------  IN:    IV PiggyBack: 400 mL    sodium chloride 0.9%: 2875 mL  Total IN: 3275 mL    OUT:    Indwelling Catheter - Urethral (mL): 4650 mL  Total OUT: 4650 mL    Total NET: -1375 mL      23 Sep 2021 07:01  -  24 Sep 2021 06:03  --------------------------------------------------------  IN:    Oral Fluid: 420 mL    sodium chloride 0.9%: 2750 mL  Total IN: 3170 mL    OUT:    Voided (mL): 3270 mL  Total OUT: 3270 mL    Total NET: -100 mL          MEDICATIONS:    ANTIBIOTICS:  cefepime   IVPB 2000 milliGRAM(s) IV Intermittent every 8 hours      PAIN CONTROL:  acetaminophen   Tablet .. 650 milliGRAM(s) Oral every 6 hours PRN  oxyCODONE    IR 5 milliGRAM(s) Oral every 6 hours PRN       MEDS:      HEME/ONC  heparin   Injectable 5000 Unit(s) SubCutaneous every 8 hours        PHYSICAL EXAM:  General: No acute distress.  Alert and Oriented  Abdominal Exam: soft, NT, ND   Exam: incision site clean and dry      LABS:                        11.6   5.83  )-----------( 150      ( 23 Sep 2021 06:44 )             35.8     09-23    142  |  108  |  15  ----------------------------<  98  3.8   |  27  |  1.17    Ca    8.2<L>      23 Sep 2021 06:44  Phos  3.9     09-23  Mg     1.9     09-23            RADIOLOGY & ADDITIONAL TESTS:

## 2021-09-24 NOTE — PROVIDER CONTACT NOTE (OTHER) - SITUATION
Hgb went down from 15.7 to 12.5 reported by Sunni Jacome from Hemo. lab
Pt and wife request to speak with a urology provider before moving to a regional unit.

## 2021-09-24 NOTE — PROVIDER CONTACT NOTE (OTHER) - RECOMMENDATIONS
Per Dr. Rangel, she will come speak with pt and wife and explain the plan to observe patient until Monday as per her attending' s orders.

## 2021-09-25 LAB
ANION GAP SERPL CALC-SCNC: 7 MMOL/L — SIGNIFICANT CHANGE UP (ref 5–17)
BUN SERPL-MCNC: 12 MG/DL — SIGNIFICANT CHANGE UP (ref 7–23)
CALCIUM SERPL-MCNC: 9.4 MG/DL — SIGNIFICANT CHANGE UP (ref 8.4–10.5)
CHLORIDE SERPL-SCNC: 105 MMOL/L — SIGNIFICANT CHANGE UP (ref 96–108)
CO2 SERPL-SCNC: 27 MMOL/L — SIGNIFICANT CHANGE UP (ref 22–31)
CREAT SERPL-MCNC: 1.01 MG/DL — SIGNIFICANT CHANGE UP (ref 0.5–1.3)
CULTURE RESULTS: SIGNIFICANT CHANGE UP
CULTURE RESULTS: SIGNIFICANT CHANGE UP
GLUCOSE SERPL-MCNC: 103 MG/DL — HIGH (ref 70–99)
HCT VFR BLD CALC: 42.1 % — SIGNIFICANT CHANGE UP (ref 39–50)
HGB BLD-MCNC: 13.8 G/DL — SIGNIFICANT CHANGE UP (ref 13–17)
MAGNESIUM SERPL-MCNC: 2.1 MG/DL — SIGNIFICANT CHANGE UP (ref 1.6–2.6)
MCHC RBC-ENTMCNC: 32.4 PG — SIGNIFICANT CHANGE UP (ref 27–34)
MCHC RBC-ENTMCNC: 32.8 GM/DL — SIGNIFICANT CHANGE UP (ref 32–36)
MCV RBC AUTO: 98.8 FL — SIGNIFICANT CHANGE UP (ref 80–100)
NRBC # BLD: 0 /100 WBCS — SIGNIFICANT CHANGE UP (ref 0–0)
PHOSPHATE SERPL-MCNC: 3.4 MG/DL — SIGNIFICANT CHANGE UP (ref 2.5–4.5)
PLATELET # BLD AUTO: 200 K/UL — SIGNIFICANT CHANGE UP (ref 150–400)
POTASSIUM SERPL-MCNC: 4.1 MMOL/L — SIGNIFICANT CHANGE UP (ref 3.5–5.3)
POTASSIUM SERPL-SCNC: 4.1 MMOL/L — SIGNIFICANT CHANGE UP (ref 3.5–5.3)
RBC # BLD: 4.26 M/UL — SIGNIFICANT CHANGE UP (ref 4.2–5.8)
RBC # FLD: 11.9 % — SIGNIFICANT CHANGE UP (ref 10.3–14.5)
SODIUM SERPL-SCNC: 139 MMOL/L — SIGNIFICANT CHANGE UP (ref 135–145)
SPECIMEN SOURCE: SIGNIFICANT CHANGE UP
SPECIMEN SOURCE: SIGNIFICANT CHANGE UP
WBC # BLD: 6.26 K/UL — SIGNIFICANT CHANGE UP (ref 3.8–10.5)
WBC # FLD AUTO: 6.26 K/UL — SIGNIFICANT CHANGE UP (ref 3.8–10.5)

## 2021-09-25 RX ADMIN — SENNA PLUS 2 TABLET(S): 8.6 TABLET ORAL at 21:39

## 2021-09-25 RX ADMIN — HEPARIN SODIUM 5000 UNIT(S): 5000 INJECTION INTRAVENOUS; SUBCUTANEOUS at 05:50

## 2021-09-25 RX ADMIN — CEFEPIME 100 MILLIGRAM(S): 1 INJECTION, POWDER, FOR SOLUTION INTRAMUSCULAR; INTRAVENOUS at 15:33

## 2021-09-25 RX ADMIN — SODIUM CHLORIDE 125 MILLILITER(S): 9 INJECTION INTRAMUSCULAR; INTRAVENOUS; SUBCUTANEOUS at 06:05

## 2021-09-25 RX ADMIN — CEFEPIME 100 MILLIGRAM(S): 1 INJECTION, POWDER, FOR SOLUTION INTRAMUSCULAR; INTRAVENOUS at 05:51

## 2021-09-25 RX ADMIN — HEPARIN SODIUM 5000 UNIT(S): 5000 INJECTION INTRAVENOUS; SUBCUTANEOUS at 15:33

## 2021-09-25 RX ADMIN — HEPARIN SODIUM 5000 UNIT(S): 5000 INJECTION INTRAVENOUS; SUBCUTANEOUS at 21:39

## 2021-09-25 RX ADMIN — CEFEPIME 100 MILLIGRAM(S): 1 INJECTION, POWDER, FOR SOLUTION INTRAMUSCULAR; INTRAVENOUS at 21:39

## 2021-09-25 NOTE — PROGRESS NOTE ADULT - PROBLEM SELECTOR PLAN 1
- stable  -OOB  -SCD's, IS  - trend labs   -continue antibx per ID: cefepime  -d/c planning: will need home wound care. social work aware.

## 2021-09-25 NOTE — PROGRESS NOTE ADULT - SUBJECTIVE AND OBJECTIVE BOX
INTERVAL HPI/OVERNIGHT EVENTS:  No acute events overnight. On 9/23 patient was seen by ID which recommended Cefepime while inpatient and when discharged Cipro.     VITALS:    T(F): 98 (09-25-21 @ 05:18), Max: 99 (09-24-21 @ 16:59)  HR: 64 (09-25-21 @ 05:18) (64 - 80)  BP: 125/81 (09-25-21 @ 05:18) (118/77 - 136/86)  RR: 18 (09-25-21 @ 05:18) (17 - 18)  SpO2: 95% (09-25-21 @ 05:18) (95% - 97%)  Wt(kg): --    I&O's Detail    23 Sep 2021 07:01  -  24 Sep 2021 07:00  --------------------------------------------------------  IN:    IV PiggyBack: 50 mL    Oral Fluid: 420 mL    sodium chloride 0.9%: 2875 mL  Total IN: 3345 mL    OUT:    Voided (mL): 3270 mL  Total OUT: 3270 mL    Total NET: 75 mL      24 Sep 2021 07:01  -  25 Sep 2021 05:43  --------------------------------------------------------  IN:    Oral Fluid: 1520 mL    sodium chloride 0.9%: 500 mL  Total IN: 2020 mL    OUT:    Voided (mL): 2300 mL  Total OUT: 2300 mL    Total NET: -280 mL          MEDICATIONS:    ANTIBIOTICS:  cefepime   IVPB 2000 milliGRAM(s) IV Intermittent every 8 hours      PAIN CONTROL:  acetaminophen   Tablet .. 650 milliGRAM(s) Oral every 6 hours PRN  oxyCODONE    IR 5 milliGRAM(s) Oral every 6 hours PRN       MEDS:      HEME/ONC  heparin   Injectable 5000 Unit(s) SubCutaneous every 8 hours        PHYSICAL EXAM:  General: No acute distress.  Alert and Oriented  Abdominal Exam: soft, NT, ND   Exam: incision site clean and dry      LABS:                        12.2   6.12  )-----------( 161      ( 24 Sep 2021 06:40 )             37.2     09-24    140  |  106  |  11  ----------------------------<  101<H>  3.9   |  27  |  0.96    Ca    8.6      24 Sep 2021 06:40  Phos  3.8     09-24  Mg     1.9     09-24

## 2021-09-26 LAB
ANION GAP SERPL CALC-SCNC: 8 MMOL/L — SIGNIFICANT CHANGE UP (ref 5–17)
BUN SERPL-MCNC: 12 MG/DL — SIGNIFICANT CHANGE UP (ref 7–23)
CALCIUM SERPL-MCNC: 8.6 MG/DL — SIGNIFICANT CHANGE UP (ref 8.4–10.5)
CHLORIDE SERPL-SCNC: 107 MMOL/L — SIGNIFICANT CHANGE UP (ref 96–108)
CO2 SERPL-SCNC: 24 MMOL/L — SIGNIFICANT CHANGE UP (ref 22–31)
CREAT SERPL-MCNC: 0.95 MG/DL — SIGNIFICANT CHANGE UP (ref 0.5–1.3)
GLUCOSE SERPL-MCNC: 101 MG/DL — HIGH (ref 70–99)
HCT VFR BLD CALC: 38.1 % — LOW (ref 39–50)
HGB BLD-MCNC: 12.5 G/DL — LOW (ref 13–17)
MAGNESIUM SERPL-MCNC: 2 MG/DL — SIGNIFICANT CHANGE UP (ref 1.6–2.6)
MCHC RBC-ENTMCNC: 32.5 PG — SIGNIFICANT CHANGE UP (ref 27–34)
MCHC RBC-ENTMCNC: 32.8 GM/DL — SIGNIFICANT CHANGE UP (ref 32–36)
MCV RBC AUTO: 99 FL — SIGNIFICANT CHANGE UP (ref 80–100)
NRBC # BLD: 0 /100 WBCS — SIGNIFICANT CHANGE UP (ref 0–0)
PHOSPHATE SERPL-MCNC: 2.9 MG/DL — SIGNIFICANT CHANGE UP (ref 2.5–4.5)
PLATELET # BLD AUTO: 179 K/UL — SIGNIFICANT CHANGE UP (ref 150–400)
POTASSIUM SERPL-MCNC: 3.9 MMOL/L — SIGNIFICANT CHANGE UP (ref 3.5–5.3)
POTASSIUM SERPL-SCNC: 3.9 MMOL/L — SIGNIFICANT CHANGE UP (ref 3.5–5.3)
RBC # BLD: 3.85 M/UL — LOW (ref 4.2–5.8)
RBC # FLD: 11.9 % — SIGNIFICANT CHANGE UP (ref 10.3–14.5)
SODIUM SERPL-SCNC: 139 MMOL/L — SIGNIFICANT CHANGE UP (ref 135–145)
WBC # BLD: 5.34 K/UL — SIGNIFICANT CHANGE UP (ref 3.8–10.5)
WBC # FLD AUTO: 5.34 K/UL — SIGNIFICANT CHANGE UP (ref 3.8–10.5)

## 2021-09-26 RX ORDER — SODIUM CHLORIDE 9 MG/ML
10 INJECTION INTRAMUSCULAR; INTRAVENOUS; SUBCUTANEOUS ONCE
Refills: 0 | Status: DISCONTINUED | OUTPATIENT
Start: 2021-09-26 | End: 2021-09-27

## 2021-09-26 RX ADMIN — HEPARIN SODIUM 5000 UNIT(S): 5000 INJECTION INTRAVENOUS; SUBCUTANEOUS at 21:03

## 2021-09-26 RX ADMIN — CEFEPIME 100 MILLIGRAM(S): 1 INJECTION, POWDER, FOR SOLUTION INTRAMUSCULAR; INTRAVENOUS at 21:03

## 2021-09-26 RX ADMIN — CEFEPIME 100 MILLIGRAM(S): 1 INJECTION, POWDER, FOR SOLUTION INTRAMUSCULAR; INTRAVENOUS at 13:43

## 2021-09-26 RX ADMIN — SENNA PLUS 2 TABLET(S): 8.6 TABLET ORAL at 21:03

## 2021-09-26 RX ADMIN — CEFEPIME 100 MILLIGRAM(S): 1 INJECTION, POWDER, FOR SOLUTION INTRAMUSCULAR; INTRAVENOUS at 05:01

## 2021-09-26 RX ADMIN — HEPARIN SODIUM 5000 UNIT(S): 5000 INJECTION INTRAVENOUS; SUBCUTANEOUS at 05:01

## 2021-09-26 RX ADMIN — HEPARIN SODIUM 5000 UNIT(S): 5000 INJECTION INTRAVENOUS; SUBCUTANEOUS at 13:43

## 2021-09-26 NOTE — PROGRESS NOTE ADULT - SUBJECTIVE AND OBJECTIVE BOX
INTERVAL HPI/OVERNIGHT EVENTS:  No acute events overnight.    VITALS:    T(F): 98 (09-25-21 @ 20:29), Max: 98.3 (09-25-21 @ 14:37)  HR: 72 (09-25-21 @ 20:29) (56 - 87)  BP: 127/83 (09-25-21 @ 20:29) (112/78 - 127/83)  RR: 17 (09-25-21 @ 20:29) (17 - 19)  SpO2: 98% (09-25-21 @ 20:29) (95% - 99%)  Wt(kg): --    I&O's Detail    24 Sep 2021 07:01  -  25 Sep 2021 07:00  --------------------------------------------------------  IN:    Oral Fluid: 1520 mL    sodium chloride 0.9%: 500 mL  Total IN: 2020 mL    OUT:    Voided (mL): 2300 mL  Total OUT: 2300 mL    Total NET: -280 mL      25 Sep 2021 07:01  -  26 Sep 2021 05:11  --------------------------------------------------------  IN:    IV PiggyBack: 50 mL    Oral Fluid: 1290 mL    sodium chloride 0.9%: 875 mL  Total IN: 2215 mL    OUT:    Voided (mL): 2700 mL  Total OUT: 2700 mL    Total NET: -485 mL          MEDICATIONS:    ANTIBIOTICS:  cefepime   IVPB 2000 milliGRAM(s) IV Intermittent every 8 hours      PAIN CONTROL:  acetaminophen   Tablet .. 650 milliGRAM(s) Oral every 6 hours PRN  oxyCODONE    IR 5 milliGRAM(s) Oral every 6 hours PRN       MEDS:      HEME/ONC  heparin   Injectable 5000 Unit(s) SubCutaneous every 8 hours        PHYSICAL EXAM:  General: No acute distress.  Alert and Oriented  Abdominal Exam: soft, NT, ND   Exam: incision site clean and dry    LABS:                        13.8   6.26  )-----------( 200      ( 25 Sep 2021 08:36 )             42.1     09-25    139  |  105  |  12  ----------------------------<  103<H>  4.1   |  27  |  1.01    Ca    9.4      25 Sep 2021 08:36  Phos  3.4     09-25  Mg     2.1     09-25               INTERVAL HPI/OVERNIGHT EVENTS:  No acute events overnight.    Patient seen during AM rounds, no acute complaints. Tolerating regular diet. Denies any fevers or chills.     VITALS:    T(F): 98 (09-25-21 @ 20:29), Max: 98.3 (09-25-21 @ 14:37)  HR: 72 (09-25-21 @ 20:29) (56 - 87)  BP: 127/83 (09-25-21 @ 20:29) (112/78 - 127/83)  RR: 17 (09-25-21 @ 20:29) (17 - 19)  SpO2: 98% (09-25-21 @ 20:29) (95% - 99%)  Wt(kg): --    I&O's Detail    24 Sep 2021 07:01  -  25 Sep 2021 07:00  --------------------------------------------------------  IN:    Oral Fluid: 1520 mL    sodium chloride 0.9%: 500 mL  Total IN: 2020 mL    OUT:    Voided (mL): 2300 mL  Total OUT: 2300 mL    Total NET: -280 mL      25 Sep 2021 07:01  -  26 Sep 2021 05:11  --------------------------------------------------------  IN:    IV PiggyBack: 50 mL    Oral Fluid: 1290 mL    sodium chloride 0.9%: 875 mL  Total IN: 2215 mL    OUT:    Voided (mL): 2700 mL  Total OUT: 2700 mL    Total NET: -485 mL          MEDICATIONS:    ANTIBIOTICS:  cefepime   IVPB 2000 milliGRAM(s) IV Intermittent every 8 hours      PAIN CONTROL:  acetaminophen   Tablet .. 650 milliGRAM(s) Oral every 6 hours PRN  oxyCODONE    IR 5 milliGRAM(s) Oral every 6 hours PRN       MEDS:      HEME/ONC  heparin   Injectable 5000 Unit(s) SubCutaneous every 8 hours        PHYSICAL EXAM:  General: No acute distress.  Alert and Oriented  Abdominal Exam: soft, NT, ND   Exam: incision site clean and dry with packing in place    LABS:                        13.8   6.26  )-----------( 200      ( 25 Sep 2021 08:36 )             42.1     09-25    139  |  105  |  12  ----------------------------<  103<H>  4.1   |  27  |  1.01    Ca    9.4      25 Sep 2021 08:36  Phos  3.4     09-25  Mg     2.1     09-25

## 2021-09-26 NOTE — PROGRESS NOTE ADULT - PROBLEM SELECTOR PLAN 1
stable  -OOB  -SCD's, IS  - trend labs   -continue antibx per ID: cefepime  -d/c planning: will need home wound care. social work aware. stable  -OOB  -SCD's, IS  - trend labs   -continue antibx per ID: cefepime then cipro PO on discharge  -d/c planning: will need home wound care. social work aware.

## 2021-09-26 NOTE — PROGRESS NOTE ADULT - ASSESSMENT
71yMale w/ infected malleable penile prosthesis s/p implant removal. Afebrile overnight.  71yMale w/ infected malleable penile prosthesis s/p implant removal. Afebrile overnight.     -

## 2021-09-27 ENCOUNTER — TRANSCRIPTION ENCOUNTER (OUTPATIENT)
Age: 71
End: 2021-09-27

## 2021-09-27 VITALS
DIASTOLIC BLOOD PRESSURE: 82 MMHG | TEMPERATURE: 98 F | HEART RATE: 64 BPM | RESPIRATION RATE: 18 BRPM | SYSTOLIC BLOOD PRESSURE: 128 MMHG | OXYGEN SATURATION: 98 %

## 2021-09-27 LAB
ANION GAP SERPL CALC-SCNC: 7 MMOL/L — SIGNIFICANT CHANGE UP (ref 5–17)
BUN SERPL-MCNC: 11 MG/DL — SIGNIFICANT CHANGE UP (ref 7–23)
CALCIUM SERPL-MCNC: 9.6 MG/DL — SIGNIFICANT CHANGE UP (ref 8.4–10.5)
CHLORIDE SERPL-SCNC: 104 MMOL/L — SIGNIFICANT CHANGE UP (ref 96–108)
CO2 SERPL-SCNC: 27 MMOL/L — SIGNIFICANT CHANGE UP (ref 22–31)
CREAT SERPL-MCNC: 1.02 MG/DL — SIGNIFICANT CHANGE UP (ref 0.5–1.3)
GLUCOSE SERPL-MCNC: 105 MG/DL — HIGH (ref 70–99)
HCT VFR BLD CALC: 44.3 % — SIGNIFICANT CHANGE UP (ref 39–50)
HGB BLD-MCNC: 14.4 G/DL — SIGNIFICANT CHANGE UP (ref 13–17)
MCHC RBC-ENTMCNC: 32.5 GM/DL — SIGNIFICANT CHANGE UP (ref 32–36)
MCHC RBC-ENTMCNC: 32.6 PG — SIGNIFICANT CHANGE UP (ref 27–34)
MCV RBC AUTO: 100.2 FL — HIGH (ref 80–100)
NRBC # BLD: 0 /100 WBCS — SIGNIFICANT CHANGE UP (ref 0–0)
PLATELET # BLD AUTO: 235 K/UL — SIGNIFICANT CHANGE UP (ref 150–400)
POTASSIUM SERPL-MCNC: 4.4 MMOL/L — SIGNIFICANT CHANGE UP (ref 3.5–5.3)
POTASSIUM SERPL-SCNC: 4.4 MMOL/L — SIGNIFICANT CHANGE UP (ref 3.5–5.3)
RBC # BLD: 4.42 M/UL — SIGNIFICANT CHANGE UP (ref 4.2–5.8)
RBC # FLD: 12.1 % — SIGNIFICANT CHANGE UP (ref 10.3–14.5)
SARS-COV-2 RNA SPEC QL NAA+PROBE: SIGNIFICANT CHANGE UP
SODIUM SERPL-SCNC: 138 MMOL/L — SIGNIFICANT CHANGE UP (ref 135–145)
WBC # BLD: 5.9 K/UL — SIGNIFICANT CHANGE UP (ref 3.8–10.5)
WBC # FLD AUTO: 5.9 K/UL — SIGNIFICANT CHANGE UP (ref 3.8–10.5)

## 2021-09-27 PROCEDURE — 87635 SARS-COV-2 COVID-19 AMP PRB: CPT

## 2021-09-27 PROCEDURE — 88300 SURGICAL PATH GROSS: CPT

## 2021-09-27 PROCEDURE — U0005: CPT

## 2021-09-27 PROCEDURE — 84100 ASSAY OF PHOSPHORUS: CPT

## 2021-09-27 PROCEDURE — 99285 EMERGENCY DEPT VISIT HI MDM: CPT | Mod: 25

## 2021-09-27 PROCEDURE — 80048 BASIC METABOLIC PNL TOTAL CA: CPT

## 2021-09-27 PROCEDURE — 82962 GLUCOSE BLOOD TEST: CPT

## 2021-09-27 PROCEDURE — 85027 COMPLETE CBC AUTOMATED: CPT

## 2021-09-27 PROCEDURE — 71045 X-RAY EXAM CHEST 1 VIEW: CPT

## 2021-09-27 PROCEDURE — 81001 URINALYSIS AUTO W/SCOPE: CPT

## 2021-09-27 PROCEDURE — 83605 ASSAY OF LACTIC ACID: CPT

## 2021-09-27 PROCEDURE — 87205 SMEAR GRAM STAIN: CPT

## 2021-09-27 PROCEDURE — 82803 BLOOD GASES ANY COMBINATION: CPT

## 2021-09-27 PROCEDURE — 85025 COMPLETE CBC W/AUTO DIFF WBC: CPT

## 2021-09-27 PROCEDURE — U0003: CPT

## 2021-09-27 PROCEDURE — 96374 THER/PROPH/DIAG INJ IV PUSH: CPT

## 2021-09-27 PROCEDURE — 80053 COMPREHEN METABOLIC PANEL: CPT

## 2021-09-27 PROCEDURE — 36415 COLL VENOUS BLD VENIPUNCTURE: CPT

## 2021-09-27 PROCEDURE — 87186 SC STD MICRODIL/AGAR DIL: CPT

## 2021-09-27 PROCEDURE — 87075 CULTR BACTERIA EXCEPT BLOOD: CPT

## 2021-09-27 PROCEDURE — 87040 BLOOD CULTURE FOR BACTERIA: CPT

## 2021-09-27 PROCEDURE — 87086 URINE CULTURE/COLONY COUNT: CPT

## 2021-09-27 PROCEDURE — 87070 CULTURE OTHR SPECIMN AEROBIC: CPT

## 2021-09-27 PROCEDURE — 83735 ASSAY OF MAGNESIUM: CPT

## 2021-09-27 RX ORDER — CIPROFLOXACIN LACTATE 400MG/40ML
1 VIAL (ML) INTRAVENOUS
Qty: 14 | Refills: 0
Start: 2021-09-27 | End: 2021-10-03

## 2021-09-27 RX ADMIN — CEFEPIME 100 MILLIGRAM(S): 1 INJECTION, POWDER, FOR SOLUTION INTRAMUSCULAR; INTRAVENOUS at 05:45

## 2021-09-27 RX ADMIN — HEPARIN SODIUM 5000 UNIT(S): 5000 INJECTION INTRAVENOUS; SUBCUTANEOUS at 05:45

## 2021-09-27 NOTE — PROGRESS NOTE ADULT - SUBJECTIVE AND OBJECTIVE BOX
INTERVAL HPI/OVERNIGHT EVENTS:  No acute events overnight.    VITALS:    T(F): 97.5 (09-27-21 @ 04:57), Max: 98.4 (09-26-21 @ 12:40)  HR: 59 (09-27-21 @ 04:57) (59 - 94)  BP: 125/78 (09-27-21 @ 04:57) (123/87 - 131/87)  RR: 16 (09-27-21 @ 04:57) (16 - 18)  SpO2: 94% (09-27-21 @ 04:57) (94% - 98%)  Wt(kg): --    I&O's Detail    25 Sep 2021 07:01  -  26 Sep 2021 07:00  --------------------------------------------------------  IN:    IV PiggyBack: 100 mL    Oral Fluid: 1290 mL    sodium chloride 0.9%: 1000 mL  Total IN: 2390 mL    OUT:    Voided (mL): 3200 mL  Total OUT: 3200 mL    Total NET: -810 mL      26 Sep 2021 07:01  -  27 Sep 2021 05:59  --------------------------------------------------------  IN:    IV PiggyBack: 50 mL    Oral Fluid: 1880 mL  Total IN: 1930 mL    OUT:    Voided (mL): 1950 mL  Total OUT: 1950 mL    Total NET: -20 mL          MEDICATIONS:    ANTIBIOTICS:  cefepime   IVPB 2000 milliGRAM(s) IV Intermittent every 8 hours      PAIN CONTROL:  acetaminophen   Tablet .. 650 milliGRAM(s) Oral every 6 hours PRN       MEDS:      HEME/ONC  heparin   Injectable 5000 Unit(s) SubCutaneous every 8 hours        PHYSICAL EXAM:  General: No acute distress.  Alert and Oriented  Abdominal Exam: soft, NT, ND   Exam: dressings scrotum and base L penis intact      LABS:                        12.5   5.34  )-----------( 179      ( 26 Sep 2021 06:26 )             38.1     09-26    139  |  107  |  12  ----------------------------<  101<H>  3.9   |  24  |  0.95    Ca    8.6      26 Sep 2021 06:26  Phos  2.9     09-26  Mg     2.0     09-26            RADIOLOGY & ADDITIONAL TESTS:

## 2021-09-27 NOTE — DISCHARGE NOTE NURSING/CASE MANAGEMENT/SOCIAL WORK - PATIENT PORTAL LINK FT
You can access the FollowMyHealth Patient Portal offered by [facility] by registering at the following website: [email or web address]. By joining [facility]’s FollowMyHealth portal, you will also be able to view your health information using other applications (apps) compatible with our system.

## 2021-09-27 NOTE — PROGRESS NOTE ADULT - REASON FOR ADMISSION
infected malleable prosthesis

## 2021-09-27 NOTE — PROGRESS NOTE ADULT - PROBLEM SELECTOR PLAN 1
-stable  -OOB  -SCD's, IS  -f/u labs  -d/c planning with wound care  -to be d/c'd with PO cipro per ID

## 2021-09-27 NOTE — PROGRESS NOTE ADULT - PROBLEM SELECTOR PLAN 2
-stable, improved  -probable d/c home today with po cipro and wound care
- continue Vanc/Zosyn while blood cultures, urine cultures and OR cultures are pending   - Tylenol PRN  - ice packs PRN.
- continue Vanc/Zosyn while blood cultures, urine cultures and OR cultures are pending   - Tylenol PRN  - ice packs PRN
- continue antibiotic as per ID: Cefepime while in house.
as above
- continue antibiotic as per ID: Cefepime while in house

## 2021-09-27 NOTE — PROGRESS NOTE ADULT - PROBLEM/PLAN-1
Yaneth Bauman is a 32 y.o. female who was seen in clinic today (12/17/2020) for an acute visit. Assessment/Plan:            * COVID-19 sample collected and submitted       * Patient given detailed CDC instructions contained within After Visit Summary    Diagnoses and all orders for this visit:    1. Flu-like symptoms  -     AMB POC KAILA INFLUENZA A/B TEST    2. Screening for cardiovascular, respiratory, and genitourinary diseases  -     NOVEL CORONAVIRUS (COVID-19)    3. Nausea  -     ondansetron (ZOFRAN ODT) 8 mg disintegrating tablet; Take 1 Tab by mouth every eight (8) hours as needed for Nausea or Vomiting. Covid like s/s with no known covid exposure. Discussed expected course/resolution/complications of diagnosis in detail with patient. Advised pt on CDC guidance, OTC medications for symptom management, red flags reviewed and should develop to seek emergency medical attn. Reviewed with her that COVID-19 pandemic is an evolving situation with rapidly changing recommendations & guidelines, continue to practice hand hygiene, social distancing, wearing of facial coverings. Regardless of testing results, should still follow CDC guidelines as there is a chance of a false negative, or symptoms may be due to a cold or flu which are also transmissible. Medical decisions are made based on the the best information available at the time. Recommended she stay tuned for updates published by trusted sources and to advise your PCP of any unexpected changes in clinical condition     Subjective:   Idalia Razo was seen today for Concern For COVID-19 (Coronavirus), Chills, Generalized Body Aches, Headache, Fatigue, and Nausea  symptoms started today and gradual increase in severity. Her  also with her is seen for acute illness onset. No other sick contacts or known covid 19 exposures. She denies a recent history/current: loss of smell/taste, cough, fever, wheezing, SOB, and PEDRAZA.        Travel Screening
Question   Response    In the last month, have you been in contact with someone who was confirmed or suspected to have Coronavirus / COVID-19? No / Unsure    Have you had a COVID-19 viral test in the last 14 days? No    Do you have any of the following new or worsening symptoms? Chills;Muscle pain;Severe headache; Fatigue;Diarrhea    Have you traveled internationally in the last month? No      Travel History   Travel since 11/17/20     No documented travel since 11/17/20          ROS - Pertinent items are noted in HPI    Patient Active Problem List   Diagnosis Code    Morbid obesity ([facility] [address] E66.01    Reflux [id number]     Home Medications    Medication Sig Start Date End Date Taking? Authorizing Provider   ondansetron (ZOFRAN ODT) 8 mg disintegrating tablet Take 1 Tab by mouth every eight (8) hours as needed for Nausea or Vomiting. 12/17/20  Yes Alberto Hamman, NP   armodafiniL (NuvigiL) 200 mg tab Take 1 Tab by mouth daily. Max Daily Amount: 1 Tab. 12/4/20  Yes Janay Bryant MD   ethinyl estradiol-etonogestrel (NUVARING) 0.12-0.015 mg/24 hr vaginal ring by Intravaginally route. Provider, Historical   multivitamin (ONE A DAY) tablet Take 1 Tab by mouth daily. Provider, Historical   acetaminophen (TYLENOL) 325 mg tablet Take  by mouth every four (4) hours as needed. Provider, Historical      Allergies   Allergen Reactions    Pcn [Penicillins] Hives          Objective:   Physical Exam  General:  Overweight, alert, cooperative, acutely ill   Ears: Normal external ear canals AU   Sinuses: Normal paranasal sinuses without tenderness   Mouth:  Lips, mucosa, and tongue normal. Teeth and gums normal: oropharynx: clear   Neck: supple, symmetrical, trachea midline. Heart: normal rate, regular rhythm, normal S1, S2, no murmurs, rubs, clicks or gallops.     Lungs: clear to auscultation bilaterally       Visit Vitals  Pulse 94   Temp 98.2 °F (36.8 °C)   SpO2 98%         Disclaimer:        Medication
risks/benefits/costs/interactions/alternatives discussed with patient. She was given an after visit summary which includes diagnoses, current medications, & vitals. She expressed understanding with the diagnosis and plan. Aspects of this note may have been generated using voice recognition software. Despite editing, there may be some syntax errors.        Jennifer Fuller NP
DISPLAY PLAN FREE TEXT

## 2021-09-27 NOTE — PROGRESS NOTE ADULT - PROBLEM SELECTOR PROBLEM 1
Infected penile implant

## 2021-09-27 NOTE — PROGRESS NOTE ADULT - PROBLEM SELECTOR PROBLEM 2
Sepsis, unspecified organism

## 2021-10-01 DIAGNOSIS — Z85.46 PERSONAL HISTORY OF MALIGNANT NEOPLASM OF PROSTATE: ICD-10-CM

## 2021-10-01 DIAGNOSIS — T83.61XA INFECTION AND INFLAMMATORY REACTION DUE TO IMPLANTED PENILE PROSTHESIS, INITIAL ENCOUNTER: ICD-10-CM

## 2021-10-01 DIAGNOSIS — I10 ESSENTIAL (PRIMARY) HYPERTENSION: ICD-10-CM

## 2021-10-01 DIAGNOSIS — Z90.79 ACQUIRED ABSENCE OF OTHER GENITAL ORGAN(S): ICD-10-CM

## 2021-10-01 DIAGNOSIS — B96.4 PROTEUS (MIRABILIS) (MORGANII) AS THE CAUSE OF DISEASES CLASSIFIED ELSEWHERE: ICD-10-CM

## 2021-10-01 DIAGNOSIS — A41.9 SEPSIS, UNSPECIFIED ORGANISM: ICD-10-CM
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
## 2021-10-01 DIAGNOSIS — B96.89 OTHER SPECIFIED BACTERIAL AGENTS AS THE CAUSE OF DISEASES CLASSIFIED ELSEWHERE: ICD-10-CM

## 2021-10-29 LAB — SURGICAL PATHOLOGY STUDY: SIGNIFICANT CHANGE UP

## 2021-11-03 ENCOUNTER — APPOINTMENT (OUTPATIENT)
Dept: CARDIOLOGY | Facility: CLINIC | Age: 71
End: 2021-11-03
Payer: MEDICARE

## 2021-11-03 VITALS
RESPIRATION RATE: 15 BRPM | BODY MASS INDEX: 31.61 KG/M2 | DIASTOLIC BLOOD PRESSURE: 91 MMHG | SYSTOLIC BLOOD PRESSURE: 132 MMHG | HEART RATE: 71 BPM | HEIGHT: 60 IN | WEIGHT: 161 LBS

## 2021-11-03 PROCEDURE — 99213 OFFICE O/P EST LOW 20 MIN: CPT

## 2021-11-03 NOTE — HISTORY OF PRESENT ILLNESS
[FreeTextEntry1] :  \par \par 1. HTN: on medications, compliant with medications.\par 2. Pre-op: he underwent penile implant but had infection and had to have it removed.\par \par Patient denies CP or SOB. \par \par No cough or fevers noted. ET is stable. \par \par  \par \par He received his COVID vaccine. \par

## 2021-11-03 NOTE — DISCUSSION/SUMMARY
[FreeTextEntry1] : 71 M HTN for f/u after surgery.\par Continue medications for HTN.\par Patient received his COVID vaccine.

## 2022-10-29 NOTE — ED ADULT NURSE NOTE - ISOLATION TYPE:
77 
None

## 2023-03-06 NOTE — PROGRESS NOTE ADULT - SUBJECTIVE AND OBJECTIVE BOX
PROGRESS NOTE:     Patient is a 69y old  Male who presents with a chief complaint of Fever, cough, hiccups (26 Dec 2019 09:04)      SUBJECTIVE / OVERNIGHT EVENTS: Pt is having less pain in the testicle but bacteremic and febrile     ADDITIONAL REVIEW OF SYSTEMS:  No fevers or chills  No n/v/d      MEDICATIONS  (STANDING):  piperacillin/tazobactam IVPB.. 3.375 Gram(s) IV Intermittent every 8 hours  vancomycin  IVPB      vancomycin  IVPB 1000 milliGRAM(s) IV Intermittent every 24 hours    MEDICATIONS  (PRN):  acetaminophen   Tablet .. 650 milliGRAM(s) Oral every 6 hours PRN Mild Pain (1 - 3)  ondansetron Injectable 4 milliGRAM(s) IV Push every 6 hours PRN Nausea      CAPILLARY BLOOD GLUCOSE        I&O's Summary    25 Dec 2019 07:01  -  26 Dec 2019 07:00  --------------------------------------------------------  IN: 420 mL / OUT: 160 mL / NET: 260 mL        PHYSICAL EXAM:  Vital Signs Last 24 Hrs  T(C): 36.8 (26 Dec 2019 04:57), Max: 38.7 (25 Dec 2019 19:46)  T(F): 98.2 (26 Dec 2019 04:57), Max: 101.7 (25 Dec 2019 19:46)  HR: 78 (26 Dec 2019 04:57) (78 - 92)  BP: 107/74 (26 Dec 2019 04:57) (107/74 - 134/70)  BP(mean): --  RR: 19 (26 Dec 2019 04:57) (18 - 19)  SpO2: 93% (26 Dec 2019 04:57) (93% - 94%)    CONSTITUTIONAL: NAD, well-developed, non toxic   RESPIRATORY: Normal respiratory effort; lungs are clear to auscultation bilaterally  CARDIOVASCULAR: Regular rate and rhythm, normal S1 and S2, no murmur/rub/gallop; No lower extremity edema; Peripheral pulses are 2+ bilaterally  ABDOMEN: Nontender to palpation, normoactive bowel sounds, no rebound/guarding; No hepatosplenomegaly  MUSCLOSKELETAL: no clubbing or cyanosis of digits; no joint swelling or tenderness to palpation  : no tenderness to palpation of test but slightly erythamatous and swollen.   PSYCH: A+O to person, place, and time; affect appropriate    LABS:                        13.3   8.31  )-----------( 178      ( 26 Dec 2019 08:51 )             39.5         134<L>  |  98  |  11  ----------------------------<  120<H>  3.9   |  23  |  0.90    Ca    8.3<L>      26 Dec 2019 07:27  Mg     2.3         TPro  6.8  /  Alb  2.9<L>  /  TBili  0.5  /  DBili  x   /  AST  31  /  ALT  25  /  AlkPhos  48      PT/INR - ( 26 Dec 2019 08:46 )   PT: 14.2 sec;   INR: 1.24 ratio         PTT - ( 25 Dec 2019 06:30 )  PTT:32.6 sec  CARDIAC MARKERS ( 25 Dec 2019 09:00 )  x     / x     / 172 U/L / x     / 1.5 ng/mL      Urinalysis Basic - ( 25 Dec 2019 15:29 )    Color: Yellow / Appearance: Clear / S.016 / pH: x  Gluc: x / Ketone: Trace  / Bili: Negative / Urobili: <2 mg/dL   Blood: x / Protein: Trace / Nitrite: Negative   Leuk Esterase: Negative / RBC: 1 /HPF / WBC 2 /HPF   Sq Epi: x / Non Sq Epi: 1 /HPF / Bacteria: Negative        Culture - Blood (collected 25 Dec 2019 09:05)  Source: .Blood Blood-Peripheral  Preliminary Report (26 Dec 2019 10:01):    No growth to date.    Culture - Blood (collected 25 Dec 2019 09:05)  Source: .Blood Blood-Peripheral  Gram Stain (26 Dec 2019 02:30):    Growth in aerobic bottle: Gram Variable Rods  Preliminary Report (26 Dec 2019 02:31):    Growth in aerobic bottle: Gram Variable Rods    "Due to technical problems, Proteus sp. will Not be reported as part of    the BCID panel until further notice"    ***Blood Panel PCR results on this specimen are available    approximately 3 hours after the Gram stain result.***    Gram stain, PCR, and/or culture results may not always    correspond due to difference in methodologies.    ************************************************************    This PCR assay was performed using [employer].    The following targets are tested for: Enterococcus,    vancomycin resistant enterococci, Listeria monocytogenes,    coagulase negative staphylococci, S. aureus,    methicillin resistant S. aureus, Streptococcus agalactiae    (Group B), S. pneumoniae, S. pyogenes (Group A),    Acinetobacter baumannii, Enterobacter cloacae, E. coli,    Klebsiella oxytoca, K. pneumoniae, Proteus sp.,    Serratia marcescens, Haemophilus influenzae,    Neisseria meningitidis, Pseudomonas aeruginosa, Candida    albicans, C. glabrata, C krusei, C parapsilosis,    C. tropicalis and the KPC resistance gene.  Organism: Blood Culture PCR (26 Dec 2019 03:55)  Organism: Blood Culture PCR (26 Dec 2019 03:55)        RADIOLOGY & ADDITIONAL TESTS:  Results Reviewed:   Imaging Personally Reviewed:  Electrocardiogram Personally Reviewed:    COORDINATION OF CARE:  Care Discussed with Consultants/Other Providers [Y/N]:  Prior or Outpatient Records Reviewed [Y/N]: Additional Area 3 Location: platysma

## 2023-05-22 ENCOUNTER — APPOINTMENT (OUTPATIENT)
Dept: VASCULAR SURGERY | Facility: CLINIC | Age: 73
End: 2023-05-22
Payer: MEDICARE

## 2023-05-22 VITALS
DIASTOLIC BLOOD PRESSURE: 86 MMHG | HEIGHT: 65 IN | WEIGHT: 158 LBS | HEART RATE: 78 BPM | OXYGEN SATURATION: 95 % | BODY MASS INDEX: 26.33 KG/M2 | TEMPERATURE: 98.4 F | SYSTOLIC BLOOD PRESSURE: 123 MMHG

## 2023-05-22 DIAGNOSIS — I71.21 ANEURYSM OF THE ASCENDING AORTA, WITHOUT RUPTURE: ICD-10-CM

## 2023-05-22 PROCEDURE — 99203 OFFICE O/P NEW LOW 30 MIN: CPT

## 2023-05-22 RX ORDER — TACROLIMUS 1 MG/G
0.1 OINTMENT TOPICAL
Qty: 100 | Refills: 0 | Status: DISCONTINUED | COMMUNITY
Start: 2021-08-23 | End: 2023-05-22

## 2023-05-22 RX ORDER — HYDROCHLOROTHIAZIDE 12.5 MG/1
TABLET ORAL
Refills: 0 | Status: DISCONTINUED | COMMUNITY
End: 2023-05-22

## 2023-05-22 RX ORDER — IBUPROFEN 600 MG/1
600 TABLET, FILM COATED ORAL
Qty: 28 | Refills: 0 | Status: DISCONTINUED | COMMUNITY
Start: 2021-08-26 | End: 2023-05-22

## 2023-05-22 RX ORDER — ACETAMINOPHEN AND CODEINE PHOSPHATE 300; 30 MG/1; MG/1
300-30 TABLET ORAL
Qty: 40 | Refills: 0 | Status: DISCONTINUED | COMMUNITY
Start: 2021-08-26 | End: 2023-05-22

## 2023-05-22 RX ORDER — CEPHALEXIN 250 MG/1
250 CAPSULE ORAL
Qty: 84 | Refills: 0 | Status: DISCONTINUED | COMMUNITY
Start: 2021-08-26 | End: 2023-05-22

## 2023-05-22 RX ORDER — OXYCODONE AND ACETAMINOPHEN 5; 325 MG/1; MG/1
5-325 TABLET ORAL
Qty: 30 | Refills: 0 | Status: DISCONTINUED | COMMUNITY
Start: 2021-08-26 | End: 2023-05-22

## 2023-05-22 NOTE — ASSESSMENT
[FreeTextEntry1] : RAS WEBSTER is a 72 year old male presents for evaluation.\par \par > Thoracic aortic aneurysm\par - Will obtain aortic duplex for evaluation of abdominal aortic aneurysm.\par - Referral made to Dr. Tomasz Becker for evaluation of thoracic aortic aneurysm. \par

## 2023-05-22 NOTE — DATA REVIEWED
[FreeTextEntry1] : CT chest was performed 4/8/2023. Report reviewed. This was significant for ascending thoracic aorta measuring 4.2 cm, aortic arch measures 3.6 cm, descending thoracic aorta measures 3.0 cm.

## 2023-05-22 NOTE — HISTORY OF PRESENT ILLNESS
[FreeTextEntry1] : RAS WEBSTER is a 72 year old male who presents for evaluation of aneurysm. \par \par Referred by Dr. Alec Durán. \par \par Visit conducted in our mutual shared language. Patient states that he underwent a CT chest for evaluation of pulmonary nodule. Recent CT chest revealed a thoracic aortic aneurysm that had slightly increased from year prior for which he presents. Patient denies any chest, back, or abdominal pain. No family history of anuerysm. Patient is a former smoker and quit smoking 30 years ago. \par \par + PMH: HTN, prostate ca\par + PSH: s/p prostatectomy, shoulder surgery, appendectomy\par + FH: breast cancer\par + SH: quit smoking 30 years ago, occasional etoh use \par + Aller: NKDA\par \par PCP is Dr. Alec Durán.

## 2023-05-22 NOTE — PHYSICAL EXAM
[2+] : left 2+ [Calm] : calm [de-identified] : Well-appearing  [de-identified] : EOMI, anicteric  [de-identified] : soft, nt, nd  [de-identified] : motor and sensation intact in all four extremities  [de-identified] : no wounds on bilateral legs [de-identified] : A&Ox4

## 2023-07-24 NOTE — ED ADULT NURSE NOTE - NS ED PATIENT SAFETY CONCERN
MAY RESUME YOUR REGULARLY SCHEDULED MEDICATIONS.  PLEASE READ THE HANDOUTS THAT WERE GIVEN TO YOU ON DISCHARGE.    No

## 2023-11-27 NOTE — ED PROVIDER NOTE - DATE/TIME OF ACCEPTANCE
Medical Necessity Clause: This procedure was medically necessary because the lesions that were treated were: Medical Necessity Information: It is in your best interest to select a reason for this procedure from the list below. All of these items fulfill various CMS LCD requirements except the new and changing color options. Consent: The patient's consent was obtained including but not limited to risks of crusting, scabbing, blistering, scarring, darker or lighter pigmentary change, recurrence, incomplete removal and infection. Show Topical Anesthesia Variable?: Yes Detail Level: Simple Post-Care Instructions: I reviewed with the patient in detail post-care instructions. Patient is to wear sunprotection, and avoid picking at any of the treated lesions. Pt may apply Vaseline to crusted or scabbing areas. Spray Paint Text: The liquid nitrogen was applied to the skin utilizing a spray paint frosting technique. Render Note In Bullet Format When Appropriate: No Number Of Freeze-Thaw Cycles: 1 freeze-thaw cycle 20-Sep-2021 14:14

## 2024-02-22 NOTE — ED ADULT NURSE NOTE - NS ED NURSE RECORD ANOTHER VITAL SIGN
I personally saw the patient with the resident, and completed the key components of the history and physical exam. I then discussed the management plan with the resident.    37-year-old female with stage IV breast cancer, malignant pericardial effusion and pleural effusion, GI bleed of unclear origin with frequent enteritis presents for hemoglobin less than 7 today.  She completed a iron infusion at her oncologist, and was sent to the ED for blood transfusion.  Patient notes lower abdominal pain, which is not new, intermittent black and tarry stools, currently being followed up with GI, scheduled for a CT enterography next week.  She did not take her Dilaudid today, and notes severe pain.      I agree with exam as documented.      I spoke with PA at [facility],  requesting transfusion to hemoglobin of 8.  Pain control is often tricky with this patient, will start with 2 mg IV Dilaudid every, but frequently has needed to be escalated to drips or PCAs.  No indication for GI bleed workup at this time, as patient has been extensively worked up and has outpatient testing scheduled.
Yes

## 2024-05-16 ENCOUNTER — APPOINTMENT (OUTPATIENT)
Dept: UROLOGY | Facility: CLINIC | Age: 74
End: 2024-05-16
Payer: MEDICARE

## 2024-05-16 VITALS
BODY MASS INDEX: 26.33 KG/M2 | WEIGHT: 158 LBS | TEMPERATURE: 97.7 F | DIASTOLIC BLOOD PRESSURE: 84 MMHG | SYSTOLIC BLOOD PRESSURE: 124 MMHG | HEIGHT: 65 IN

## 2024-05-16 DIAGNOSIS — T83.61XD INFECTION AND INFLAMMATORY REACTION DUE TO IMPLANTED PENILE PROSTHESIS, SUBSEQUENT ENCOUNTER: ICD-10-CM

## 2024-05-16 DIAGNOSIS — T83.61XS INFECTION AND INFLAMMATORY REACTION DUE TO IMPLANTED PENILE PROSTHESIS, SEQUELA: ICD-10-CM

## 2024-05-16 DIAGNOSIS — N52.9 MALE ERECTILE DYSFUNCTION, UNSPECIFIED: ICD-10-CM

## 2024-05-16 DIAGNOSIS — T83.89XA OTHER SPECIFIED COMPLICATION OF GENITOURINARY PROSTHETIC DEVICES, IMPLANTS AND GRAFTS, INITIAL ENCOUNTER: ICD-10-CM

## 2024-05-16 DIAGNOSIS — T83.420D: ICD-10-CM

## 2024-05-16 DIAGNOSIS — N48.89 OTHER SPECIFIED DISORDERS OF PENIS: ICD-10-CM

## 2024-05-16 PROCEDURE — 99204 OFFICE O/P NEW MOD 45 MIN: CPT

## 2024-05-16 PROCEDURE — 99214 OFFICE O/P EST MOD 30 MIN: CPT

## 2024-05-19 PROBLEM — T83.420D: Status: ACTIVE | Noted: 2020-01-03

## 2024-05-19 PROBLEM — N48.89 ATROPHY OF CORPUS CAVERNOSUM: Status: ACTIVE | Noted: 2024-05-19

## 2024-05-19 PROBLEM — T83.61XD: Status: ACTIVE | Noted: 2020-01-03

## 2024-05-19 PROBLEM — T83.89XA EROSION OF PENILE PROSTHESIS: Status: ACTIVE | Noted: 2024-05-19

## 2024-05-19 PROBLEM — N52.9 ERECTILE DYSFUNCTION OF ORGANIC ORIGIN: Status: ACTIVE | Noted: 2024-05-19

## 2024-05-19 PROBLEM — T83.61XS: Status: ACTIVE | Noted: 2024-05-19

## 2024-05-19 NOTE — PHYSICAL EXAM
[General Appearance - Well Developed] : well developed [General Appearance - Well Nourished] : well nourished [Heart Rate And Rhythm] : heart rate and rhythm were normal [] : no respiratory distress [Respiration, Rhythm And Depth] : normal respiratory rhythm and effort [Bowel Sounds] : normal bowel sounds [Abdomen Soft] : soft [de-identified] : Penile implant examination:   The overall appearance of the penis and scrotum is excellent.  There is minimal edema and swelling.  There is no bruising noted.  No erythema.  The incision is clean and dry.  The skin edges are well approximated.  The cylinders of the implant are appropriately sized with the cylinder tip well-placed in the mid glans.  Scrotal skin is intact.  The location of the pump is good.  It is concealed yet easily accessible.  There is no tethering of the pump to the skin. The reservoir is well-positioned and nonpalpable. The implant was cycled, the cylinders failed to inflate, and the pump is flat indicating a fluid loss from the device. [Scrotum] : the scrotum was normal [Rectal Exam - Seminal Vesicles] : the seminal vesicles were normal [Epididymis] : the epididymides were normal [Testes Tenderness] : no tenderness of the testes [Testes Mass (___cm)] : there were no testicular masses [TextEntry] : The penis is circumcised. Severe fibrosis and atrophy of the cavernosal bodies is noted on palpation. The length of the penis is fixed and inelastic. The stretched penile length is diminished. The scrotum and testicles are normal. The skin of the penile shaft and scrotum is clean and intact.

## 2024-05-19 NOTE — PLAN
[TextEntry] : A/P, 73M w/ ED - pt advised to consider malleable.  Advised 30% success rate considering extent of scar tissue - Pt will f/u as needed The patient scheduled this consultation to discuss the different treatment options available for his organic erectile dysfunction. The following note describes the conversation that was performed today during the consultation.    I reviewed the Patient History Form which the patient filled out, made sure that his ailment was organic erectile dysfunction and I discussed in detail with him all previously tried treatments for his ED. We had a thorough discussion about all of the alternatives available, and I made sure to include in our discussion pills such as Levitra/Viagra/Cialis, as well as penile self-injectable therapies, MUSE (Medicated Urethral System for Erection), vacuum device, and penile implant.  I stressed the risks and benefits and pros and cons of each of these options extensively. A power point presentation was also used to illustrate each treatment option. The patient was also provided with a packet of written information as well as a list of patients to speak to on the phone.   In discussing penile implant surgery, the patient was made aware of the different types of penile implants- including semirigid devices, 2-piece or Ambicor (AMS) devices, and the inflatable penile prosthesis with 3 components. I went on to mention that there are 2 brands of devices, Coloplast and AMS, and that the AMS is impregnated with antibiotic (inhibizone), and the Coloplast is dipped then coated with an antibiotic. I also referred him to my website in order to obtain additional information about the types of implants available.  He felt he would defer to my judgment as to which device to use.   I also described the highlights and benefits of the "No-Touch" surgical technique and outcome data including number of procedures previously performed and updated rate of infection. In this initial discussion of the penile implant option, I made sure we had a very long and patricia discussion about the risks.  I stated that, first and foremost, infection is the most dreaded risk and complication, which range in incidence from 1-3% of all cases performed in the [place], but that in my hands, using the "No-Touch" technique the incidence of infection is less than 1%.  I stated that should infection occur, the entire device would need to be removed, which typically happens in the first several weeks after surgery.  I explained that should this occur, there would likely be corporal fibrosis, scarring, penile shortening and even penile necrosis and disfigurement.  I said that while I would do absolutely everything possible to reduce and mitigate this risk, if it occurs, the device will have to be removed, and then a salvage procedure with a semi-rigid implant possibly done, or the device would have to be removed with delayed re-implantation, or simply avoid future surgery completely.  I explained what this salvage procedure is, and that a new implant could be placed in the same setting with a complex irrigation of antibiotics and saline lavage, but that the infection risk at this salvage procedure is even higher, up to 30%. Furthermore, the possible need for hospitalization, prolonged intravenous antibiotics and need further additional surgery was also discussed.  The patient was informed that if the salvage operation failed or if the infected implant were to be removed completely that significant shortening of the penis would occur making implantation of another device very difficult with very poor outcome and patient satisfaction. I explained that this is a real and significant risk that has to be weighed and considered.   Next, I expounded on the other risks of the operation.  These include injury to the urethra or bladder, and should these occur, the operation would have to be altered or aborted.  I explained that very rarely, vascular injury and bleeding can happen, and if iliac vein injury occurs from reservoir placement, this could be catastrophic and result in major blood loss and theoretically risk of leg loss in severe instances.   I went on to discuss that after the implant is placed, penile shortening could likely occur, and this is up to 1-2cm total.  Some of this is due to lack of glans engorgement, though MUSE could be used post-operatively to reduce this factor.  Next, I also explained the risk of dissatisfaction with the cosmetic or functional result of the device, meaning that he could simply be unhappy with the result.  Some people find that while they have a good full erection, they have changes in sensation, difficulty obtaining an orgasm, and dissatisfaction with sex in general.  I made sure he verbalized and demonstrated a good understanding of these points.   Next, I explained the risk of device breakage or failure, and future operations might be needed should this occur to fix the device tubing breakages with fluid leaks.  There is also a risk of auto-inflation, and even inability to successfully use the device due to technical considerations and inability to use or find the pump.  I did state that I would be available to him to teach him and train him to use his device, and also available to treat any other issue mentioned above such as device breakage or auto-inflation.   Next, we discussed the fact that rarely further minor surgery may be needed to make final adjustments to the penile implant. Reasons for this would be to adjust the length of the cylinders, reposition the pump or location of the reservoir.   Prior to scheduling surgery, the patient was asked to read the material, which is provided to him today, to see a cardiologist to obtain a medical clearance, to visit our website [email or web address]   to obtain additional information, to call patients who were previously implanted and to discuss his options with his partner. Before leaving the consultation, I made sure he verbalized understanding all the risk and benefits, and pros and cons of surgery.  He had the ability to ask questions, and I also explained to him what to expect from the surgery.  I made sure he had access to literature to read and offered him the ability to speak to prior patients of mine to get a sense of what to expect, and that these reports would hopefully be as unbiased as possible. If he remains interested in having an implant, he understands that he will need to schedule a penile Duplex ultrasound study during which measurements of the penis will be made.

## 2024-07-09 NOTE — PATIENT PROFILE ADULT - NSPROPTRIGHTBILLOFRIGHTS_GEN_A_NUR
Patient: Raheem Jeong    Procedure: Procedure(s):  COLONOSCOPY, BIOPSY and POLYPECTOMY       Diagnosis: Personal history of colonic polyps [Z86.010]  Diagnosis Additional Information: No value filed.    Anesthesia Type:   MAC     Note:    Oropharynx: oropharynx clear of all foreign objects and spontaneously breathing  Level of Consciousness: awake  Oxygen Supplementation: face mask  Level of Supplemental Oxygen (L/min / FiO2): 10  Independent Airway: airway patency satisfactory and stable  Dentition: dentition unchanged  Vital Signs Stable: post-procedure vital signs reviewed and stable  Report to RN Given: handoff report given  Patient transferred to: Phase II  Comments:     Handoff Report: Identifed the Patient, Identified the Reponsible Provider, Reviewed the pertinent medical history, Discussed the surgical course, Reviewed Intra-OP anesthesia mangement and issues during anesthesia, Set expectations for post-procedure period and Allowed opportunity for questions and acknowledgement of understanding  Vitals:  Vitals Value Taken Time   /58 07/09/24 0739   Temp 96.9  F (36.1  C) 07/09/24 0739   Pulse 76 07/09/24 0741   Resp 16 07/09/24 0739   SpO2 96 % 07/09/24 0741   Vitals shown include unfiled device data.    Electronically Signed By: CHASE Cantor CRNA  July 9, 2024  7:44 AM   patient

## 2024-07-22 ENCOUNTER — NON-APPOINTMENT (OUTPATIENT)
Age: 74
End: 2024-07-22

## 2024-07-23 ENCOUNTER — APPOINTMENT (OUTPATIENT)
Dept: GASTROENTEROLOGY | Facility: CLINIC | Age: 74
End: 2024-07-23
Payer: MEDICARE

## 2024-07-23 VITALS
SYSTOLIC BLOOD PRESSURE: 151 MMHG | OXYGEN SATURATION: 96 % | HEIGHT: 65 IN | WEIGHT: 158 LBS | BODY MASS INDEX: 26.33 KG/M2 | HEART RATE: 58 BPM | DIASTOLIC BLOOD PRESSURE: 95 MMHG

## 2024-07-23 DIAGNOSIS — D13.1 BENIGN NEOPLASM OF STOMACH: ICD-10-CM

## 2024-07-23 PROCEDURE — 99205 OFFICE O/P NEW HI 60 MIN: CPT

## 2024-07-23 NOTE — PHYSICAL EXAM
[Alert] : alert [No Acute Distress] : no acute distress [Sclera] : the sclera and conjunctiva were normal [Normal Appearance] : the appearance of the neck was normal [No Respiratory Distress] : no respiratory distress [Auscultation Breath Sounds / Voice Sounds] : lungs were clear to auscultation bilaterally [Normal S1, S2] : normal S1 and S2 [None] : no edema [Abnormal Walk] : normal gait [No Focal Deficits] : no focal deficits [Normal Affect] : the affect was normal [Normal Mood] : the mood was normal

## 2024-07-25 ENCOUNTER — RESULT REVIEW (OUTPATIENT)
Age: 74
End: 2024-07-25

## 2024-07-26 ENCOUNTER — APPOINTMENT (OUTPATIENT)
Dept: CT IMAGING | Facility: IMAGING CENTER | Age: 74
End: 2024-07-26
Payer: MEDICARE

## 2024-07-26 PROCEDURE — 74177 CT ABD & PELVIS W/CONTRAST: CPT | Mod: 26,MH

## 2024-07-27 NOTE — CONSULT LETTER
[Dear  ___] : Dear  [unfilled], [Consult Letter:] : I had the pleasure of evaluating your patient, [unfilled]. [Please see my note below.] : Please see my note below. [Consult Closing:] : Thank you very much for allowing me to participate in the care of this patient.  If you have any questions, please do not hesitate to contact me. [Sincerely,] : Sincerely, [DrAnthony  ___] : Dr. MOODY [FreeTextEntry3] : Ming Shea MD, MPH, ATIF, CAMERONG Chief of Clinical Quality in Gastroenterology, [facility] Associate Chief of Gastroenterology, [facility] Interim Director of Endoscopy, [address], [address] [facility], [id number] 24 hours [phone]

## 2024-07-27 NOTE — REASON FOR VISIT
[Spouse] : spouse [Other: ______] : provided by FRANSISCO [Time Spent: ____ minutes] : Total time spent using  services: [unfilled] minutes. The patient's primary language is not English thus required  services. [Source: ______] : History obtained from [unfilled] [Interpreters_IDNumber] : [id number] [Interpreters_FullName] : Shasha [TWNoteComboBox1] : Slovak

## 2024-07-27 NOTE — REASON FOR VISIT
[Spouse] : spouse [Other: ______] : provided by FRANSISCO [Time Spent: ____ minutes] : Total time spent using  services: [unfilled] minutes. The patient's primary language is not English thus required  services. [Source: ______] : History obtained from [unfilled] [Interpreters_IDNumber] : [id number] [Interpreters_FullName] : Shasha [TWNoteComboBox1] : Sami

## 2024-07-27 NOTE — CONSULT LETTER
[Dear  ___] : Dear  [unfilled], [Consult Letter:] : I had the pleasure of evaluating your patient, [unfilled]. [Please see my note below.] : Please see my note below. [Consult Closing:] : Thank you very much for allowing me to participate in the care of this patient.  If you have any questions, please do not hesitate to contact me. [Sincerely,] : Sincerely, [DrAnthony  ___] : Dr. MOODY [FreeTextEntry3] : Ming Shea MD, MPH, ATIF, CAMERONG Chief of Clinical Quality in Gastroenterology, [facility] Associate Chief of Gastroenterology, [facility] Interim Director of Endoscopy, [address], [address] [facility], [id number] 24 hours [phone]

## 2024-07-27 NOTE — ASSESSMENT
[FreeTextEntry1] : Impression:   #1.  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    #2.  Hypertension  Plan::   #1.  EGD/EUS/EMR.  Discussed ESD as well if lesion is unresectable by endoscopic mucosal resection.  Risks, benefits, alternatives of the procedure were discussed with the patient and the patient was educated about the procedure. Risks include, but are not limited to, bleeding, infection, injury to internal organs, possible need for further procedures including emergency surgery, missed lesions, risk of anesthesia, and risk of IV site problems.  Patient understands and agrees to proceed, with EGD/EUS/evaluation for endoscopic mucosal resection.  He understands the possible need for further referral for endoscopic submucosal dissection.  #2.  CT scan abdomen pelvis to look for evidence of other lesions.

## 2024-07-27 NOTE — CONSULT LETTER
[Dear  ___] : Dear  [unfilled], [Consult Letter:] : I had the pleasure of evaluating your patient, [unfilled]. [Please see my note below.] : Please see my note below. [Consult Closing:] : Thank you very much for allowing me to participate in the care of this patient.  If you have any questions, please do not hesitate to contact me. [Sincerely,] : Sincerely, [DrAnthony  ___] : Dr. MOODY [FreeTextEntry3] : Ming Shea MD, MPH, ATIF, CAEMRONG Chief of Clinical Quality in Gastroenterology, [facility] Associate Chief of Gastroenterology, [facility] Interim Director of Endoscopy, [address], [address] [facility], [address] 24 hours [phone]

## 2024-07-27 NOTE — HISTORY OF PRESENT ILLNESS
[FreeTextEntry1] : Referred by Dr. 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 Upper endoscopy performed in July 2024 for workup of epigastric pain starting several months earlier.  Patient placed on omeprazole 40 mg postprocedure.  Smoker.  Currently asymptomatic, without fevers, chills, sweats, abdominal pain, heartburn, dysphagia, nausea, vomiting, constipation, diarrhea, melena, hematochezia, jaundice or weight loss.  Endoscopy report personally reviewed.  Images reviewed.  Biopsy taken on nodular mucosa of the incisura.  Size not reported.  Unable to determine the margins of the lesion from the color pictures on the report.

## 2024-07-27 NOTE — REASON FOR VISIT
[Spouse] : spouse [Other: ______] : provided by FRANSISCO [Time Spent: ____ minutes] : Total time spent using  services: [unfilled] minutes. The patient's primary language is not English thus required  services. [Source: ______] : History obtained from [unfilled] [Interpreters_IDNumber] : [id number] [Interpreters_FullName] : Shasha [TWNoteComboBox1] : Portuguese

## 2024-08-21 ENCOUNTER — TRANSCRIPTION ENCOUNTER (OUTPATIENT)
Age: 74
End: 2024-08-21

## 2024-08-21 ENCOUNTER — OUTPATIENT (OUTPATIENT)
Dept: OUTPATIENT SERVICES | Facility: HOSPITAL | Age: 74
LOS: 1 days | End: 2024-08-21
Payer: MEDICARE

## 2024-08-21 ENCOUNTER — RESULT REVIEW (OUTPATIENT)
Age: 74
End: 2024-08-21

## 2024-08-21 ENCOUNTER — APPOINTMENT (OUTPATIENT)
Dept: GASTROENTEROLOGY | Facility: HOSPITAL | Age: 74
End: 2024-08-21

## 2024-08-21 VITALS
RESPIRATION RATE: 17 BRPM | DIASTOLIC BLOOD PRESSURE: 76 MMHG | SYSTOLIC BLOOD PRESSURE: 123 MMHG | HEART RATE: 67 BPM | OXYGEN SATURATION: 99 %

## 2024-08-21 VITALS
RESPIRATION RATE: 17 BRPM | DIASTOLIC BLOOD PRESSURE: 77 MMHG | TEMPERATURE: 97 F | OXYGEN SATURATION: 98 % | HEIGHT: 66 IN | HEART RATE: 60 BPM | WEIGHT: 154.1 LBS | SYSTOLIC BLOOD PRESSURE: 154 MMHG

## 2024-08-21 DIAGNOSIS — D13.1 BENIGN NEOPLASM OF STOMACH: ICD-10-CM

## 2024-08-21 DIAGNOSIS — Z96.0 PRESENCE OF UROGENITAL IMPLANTS: Chronic | ICD-10-CM

## 2024-08-21 DIAGNOSIS — Z90.79 ACQUIRED ABSENCE OF OTHER GENITAL ORGAN(S): Chronic | ICD-10-CM

## 2024-08-21 DIAGNOSIS — Z90.49 ACQUIRED ABSENCE OF OTHER SPECIFIED PARTS OF DIGESTIVE TRACT: Chronic | ICD-10-CM

## 2024-08-21 PROCEDURE — 88305 TISSUE EXAM BY PATHOLOGIST: CPT | Mod: 26

## 2024-08-21 PROCEDURE — 43237 ENDOSCOPIC US EXAM ESOPH: CPT

## 2024-08-21 PROCEDURE — 43239 EGD BIOPSY SINGLE/MULTIPLE: CPT | Mod: 59

## 2024-08-21 PROCEDURE — 88305 TISSUE EXAM BY PATHOLOGIST: CPT

## 2024-08-21 PROCEDURE — 43239 EGD BIOPSY SINGLE/MULTIPLE: CPT

## 2024-08-21 RX ORDER — OXYCODONE HYDROCHLORIDE 5 MG/1
5 TABLET ORAL ONCE
Refills: 0 | Status: DISCONTINUED | OUTPATIENT
Start: 2024-08-21 | End: 2024-08-21

## 2024-08-21 RX ORDER — FENTANYL CITRATE 50 UG/ML
25 INJECTION INTRAMUSCULAR; INTRAVENOUS
Refills: 0 | Status: DISCONTINUED | OUTPATIENT
Start: 2024-08-21 | End: 2024-08-21

## 2024-08-21 RX ORDER — OXYCODONE HYDROCHLORIDE 5 MG/1
10 TABLET ORAL ONCE
Refills: 0 | Status: DISCONTINUED | OUTPATIENT
Start: 2024-08-21 | End: 2024-08-21

## 2024-08-21 RX ORDER — ACETAMINOPHEN 325 MG/1
1000 TABLET ORAL ONCE
Refills: 0 | Status: DISCONTINUED | OUTPATIENT
Start: 2024-08-21 | End: 2024-09-04

## 2024-08-21 RX ORDER — ONDANSETRON 2 MG/ML
4 INJECTION, SOLUTION INTRAMUSCULAR; INTRAVENOUS ONCE
Refills: 0 | Status: DISCONTINUED | OUTPATIENT
Start: 2024-08-21 | End: 2024-09-04

## 2024-08-21 RX ORDER — SODIUM CHLORIDE 9 MG/ML
500 INJECTION INTRAMUSCULAR; INTRAVENOUS; SUBCUTANEOUS
Refills: 0 | Status: COMPLETED | OUTPATIENT
Start: 2024-08-21 | End: 2024-08-21

## 2024-08-21 RX ADMIN — SODIUM CHLORIDE 30 MILLILITER(S): 9 INJECTION INTRAMUSCULAR; INTRAVENOUS; SUBCUTANEOUS at 10:18

## 2024-08-21 NOTE — ASU PATIENT PROFILE, ADULT - NS TRANSFER DENTURES
Does patient have record of home blood pressure readings no. Readings have been averaging uknown.   Last dose of blood pressure medication was taken at 0730.  Patient is asymptomatic.       BP: (!) 160/100 , Pulse: 65 .    Blood pressure reading after 15 minutes was   160/100, Pulse 62.   Partial/Upper

## 2024-08-21 NOTE — PRE PROCEDURE NOTE - PRE PROCEDURE EVALUATION
Attending Physician:   Shabbir                         Procedure:  Upper endoscopy / endoscopic ultrasound / endoscopic mucosal resection.    Indication for Procedure:  Gastric intestinal metaplasia with high grade dysplasia.  ________________________________________________________  PAST MEDICAL & SURGICAL HISTORY:  Rotator cuff disorder      Hypertension      Prostate cancer  s/p prostatectomy, denies chemo and radiation      Benign neoplasm of stomach      Erectile dysfunction      Thoracic aortic aneurysm      S/P appendectomy  20y/o      S/P prostatectomy  2011      History of penile implant        ALLERGIES:  No Known Allergies    HOME MEDICATIONS:  losartan-hydrochlorothiazide 50mg-12.5mg oral tablet: 1 tab(s) orally once a day    AICD/PPM: [ ] yes   [ x] no    PERTINENT LAB DATA:                      PHYSICAL EXAMINATION:    Height (cm): 167.6  Weight (kg): 69.9  BMI (kg/m2): 24.9  BSA (m2): 1.79T(C): 36.2  HR: 60  BP: 154/77  RR: 17  SpO2: 98%    Constitutional: NAD  HEENT: anicteric  Neck:  No JVD  Respiratory: CTAB/L  Cardiovascular: S1 and S2  Gastrointestinal: BS+, soft, NT/ND  Extremities: No peripheral edema  Neurological: No confusion  Psychiatric: Normal mood, normal affect  Skin: No jaundice    ASA Class: I [ ]  II [ x]  III [ ]  IV [ ]    COMMENTS:    The patient is a suitable candidate for the planned procedure unless box checked [ ]  No, explain:

## 2024-08-21 NOTE — PATIENT PROFILE ADULT - MONEY FOR FOOD
Dear Paty Combs,  August 20 labs showed lipase stable for you at 28 from previous 24 in May.  Sugar was a little up at 101 and unclear if you were fasting?  BUN of 7 creatinine 0.63 sodium 139 potassium 4.2 chloride normal at this time at 103 and CO2 of 22.  WBC 3.8 hemoglobin 12.5 platelet count 211 MCV 94 with normal neutrophils and lymphocytes.  Total protein 7.3 albumin 4.1 bilirubin 0.4 and direct bili 0.13 and these are normal.  Alk phos continues to drop now down to 128 from 132 and prior 149.  AST down to 23 and ALT down to 14 and this had come down from previous AST 27 and ALT 14 on prior AST 28 and ALT 19.  It does appear that you are drifting back to baseline and I would probably repeat the labs again in 2 weeks and hopefully by then you are back to your baseline.  Dr. Isbell
no

## 2024-08-21 NOTE — ASU PATIENT PROFILE, ADULT - NSICDXPASTMEDICALHX_GEN_ALL_CORE_FT
PAST MEDICAL HISTORY:  Benign neoplasm of stomach     Erectile dysfunction     Hypertension     Prostate cancer s/p prostatectomy, denies chemo and radiation    Rotator cuff disorder     Thoracic aortic aneurysm

## 2024-08-21 NOTE — PRE-ANESTHESIA EVALUATION ADULT - NSANTHOSAYNRD_GEN_A_CORE
No. HEAVENLY screening performed.  STOP BANG Legend: 0-2 = LOW Risk; 3-4 = INTERMEDIATE Risk; 5-8 = HIGH Risk

## 2024-08-27 LAB — SURGICAL PATHOLOGY STUDY: SIGNIFICANT CHANGE UP

## 2024-09-10 PROBLEM — N52.9 MALE ERECTILE DYSFUNCTION, UNSPECIFIED: Chronic | Status: ACTIVE | Noted: 2024-08-14

## 2024-09-10 PROBLEM — I71.20 THORACIC AORTIC ANEURYSM, WITHOUT RUPTURE, UNSPECIFIED: Chronic | Status: ACTIVE | Noted: 2024-08-14

## 2024-09-10 PROBLEM — D13.1 BENIGN NEOPLASM OF STOMACH: Chronic | Status: ACTIVE | Noted: 2024-08-14

## 2024-09-18 ENCOUNTER — APPOINTMENT (OUTPATIENT)
Dept: GASTROENTEROLOGY | Facility: CLINIC | Age: 74
End: 2024-09-18
Payer: MEDICARE

## 2024-09-18 DIAGNOSIS — K31.A22 GASTRIC INTESTINAL METAPLASIA WITH HIGH GRADE DYSPLASIA: ICD-10-CM

## 2024-09-18 PROCEDURE — 99443: CPT | Mod: 93

## 2024-09-18 NOTE — HISTORY OF PRESENT ILLNESS
[FreeTextEntry1] : 74M with pmhx of HTN presenting for evaluation of gastric dysplastic lesions. Had initial EGD with incisura lesion showing HGD. Then EGD/EUS with Dr. Shea, appeared superficial. Additional lesions biopsied with LGD. Referred for ESD. Denies any other complaints, no abd pain, n/v/d/c, melena, hematochezia, fever/chills, jaundice, dark urine, or other issues.

## 2024-09-18 NOTE — REASON FOR VISIT
[Home] : at home, [unfilled] , at the time of the visit. [Medical Office: ([facility])___] : at the medical office located in  [Patient] : the patient [Self] : self [This encounter was initiated by telehealth (audio with video) and converted to telephone (audio only) due to technical difficulties.] : This encounter was initiated by telehealth (audio with video) and converted to telephone (audio only) due to technical difficulties. [Initial Evaluation] : an initial evaluation

## 2024-10-02 ENCOUNTER — APPOINTMENT (OUTPATIENT)
Dept: GASTROENTEROLOGY | Facility: CLINIC | Age: 74
End: 2024-10-02
Payer: MEDICARE

## 2024-10-02 VITALS
HEART RATE: 62 BPM | HEIGHT: 65 IN | BODY MASS INDEX: 25.83 KG/M2 | SYSTOLIC BLOOD PRESSURE: 132 MMHG | DIASTOLIC BLOOD PRESSURE: 88 MMHG | RESPIRATION RATE: 16 BRPM | WEIGHT: 155 LBS | OXYGEN SATURATION: 96 %

## 2024-10-02 DIAGNOSIS — K31.A22 GASTRIC INTESTINAL METAPLASIA WITH HIGH GRADE DYSPLASIA: ICD-10-CM

## 2024-10-02 DIAGNOSIS — D13.1 BENIGN NEOPLASM OF STOMACH: ICD-10-CM

## 2024-10-02 PROCEDURE — G2211 COMPLEX E/M VISIT ADD ON: CPT

## 2024-10-02 PROCEDURE — 99213 OFFICE O/P EST LOW 20 MIN: CPT

## 2024-10-02 NOTE — HISTORY OF PRESENT ILLNESS
No
[FreeTextEntry1] : 73M w/ ED Here for consult r/t malfunction of IPP, s/p IPP 9/10/2021 Had original implants with Dr. Harry Marshall Previous IPP 9/18/2020, 7/08/2019 and 4/02/2019 Sustained eroded implant and infected devices. He presents today to see whether of not he is a candidate for another implant

## 2024-10-02 NOTE — HISTORY OF PRESENT ILLNESS
[FreeTextEntry1] : 74M with pmhx of gastric incisura HGD lesion presenting for follow-up. Had scheduled EGD with ESD tomorrow. Had additional questions today. Denies any other complaints, no abd pain, n/v/d/c, melena, hematochezia, fever/chills, jaundice, dark urine, or other issues.

## 2024-10-03 ENCOUNTER — RESULT REVIEW (OUTPATIENT)
Age: 74
End: 2024-10-03

## 2024-10-03 ENCOUNTER — TRANSCRIPTION ENCOUNTER (OUTPATIENT)
Age: 74
End: 2024-10-03

## 2024-10-03 ENCOUNTER — APPOINTMENT (OUTPATIENT)
Dept: GASTROENTEROLOGY | Facility: HOSPITAL | Age: 74
End: 2024-10-03

## 2024-10-03 RX ORDER — PANTOPRAZOLE 40 MG/1
40 TABLET, DELAYED RELEASE ORAL TWICE DAILY
Qty: 60 | Refills: 2 | Status: ACTIVE | COMMUNITY
Start: 2024-10-03 | End: 1900-01-01

## 2025-02-10 NOTE — PACU DISCHARGE NOTE - PAIN:
Stable oxygen level on room air 95% patient will follow-up with pulmonary as outpatient      Controlled with current regime

## (undated) DEVICE — BIOPSY FORCEP RADIAL JAW 4 STANDARD WITH NEEDLE

## (undated) DEVICE — SUCTION YANKAUER NO CONTROL VENT

## (undated) DEVICE — SYR ALLIANCE II INFLATION 60ML

## (undated) DEVICE — SENSOR O2 FINGER ADULT

## (undated) DEVICE — TUBING SUCTION 20FT

## (undated) DEVICE — PACK IV START WITH CHG

## (undated) DEVICE — TUBING SUCTION CONN 6FT STERILE

## (undated) DEVICE — CATH IV SAFE BC 22G X 1" (BLUE)

## (undated) DEVICE — BITE BLOCK ADULT 20 X 27MM (GREEN)

## (undated) DEVICE — FORCEP RADIAL JAW 4 JUMBO 2.8MM 3.2MM 240CM ORANGE DISP

## (undated) DEVICE — CATH BLLN ULTRASONIC ENSOSCOPE

## (undated) DEVICE — CATH IV SAFE BC 20G X 1.16" (PINK)

## (undated) DEVICE — BALLOON US ENDO

## (undated) DEVICE — SOL INJ NS 0.9% 500ML 2 PORT

## (undated) DEVICE — IRRIGATOR BIO SHIELD

## (undated) DEVICE — TUBING IV SET GRAVITY 3Y 100" MACRO

## (undated) DEVICE — BRUSH COLONOSCOPY CYTOLOGY

## (undated) DEVICE — FOLEY HOLDER STATLOCK 2 WAY ADULT